# Patient Record
Sex: MALE | Race: BLACK OR AFRICAN AMERICAN | NOT HISPANIC OR LATINO | Employment: UNEMPLOYED | URBAN - METROPOLITAN AREA
[De-identification: names, ages, dates, MRNs, and addresses within clinical notes are randomized per-mention and may not be internally consistent; named-entity substitution may affect disease eponyms.]

---

## 2023-03-06 ENCOUNTER — HOSPITAL ENCOUNTER (OUTPATIENT)
Dept: CT IMAGING | Facility: HOSPITAL | Age: 78
Discharge: HOME | End: 2023-03-06
Payer: MEDICARE

## 2023-03-06 ENCOUNTER — OFFICE VISIT (OUTPATIENT)
Dept: PULMONOLOGY | Facility: CLINIC | Age: 78
End: 2023-03-06
Payer: MEDICARE

## 2023-03-06 ENCOUNTER — HOSPITAL ENCOUNTER (OUTPATIENT)
Dept: RESPIRATORY THERAPY | Facility: HOSPITAL | Age: 78
Discharge: HOME | End: 2023-03-06
Payer: MEDICARE

## 2023-03-06 ENCOUNTER — LAB (OUTPATIENT)
Dept: LAB | Facility: HOSPITAL | Age: 78
End: 2023-03-06
Payer: MEDICARE

## 2023-03-06 VITALS
OXYGEN SATURATION: 97 % | TEMPERATURE: 97.8 F | WEIGHT: 239 LBS | SYSTOLIC BLOOD PRESSURE: 147 MMHG | HEART RATE: 93 BPM | RESPIRATION RATE: 21 BRPM | HEIGHT: 70 IN | DIASTOLIC BLOOD PRESSURE: 100 MMHG | BODY MASS INDEX: 34.22 KG/M2

## 2023-03-06 DIAGNOSIS — R06.2 WHEEZING: ICD-10-CM

## 2023-03-06 DIAGNOSIS — R06.02 SOB (SHORTNESS OF BREATH): ICD-10-CM

## 2023-03-06 DIAGNOSIS — J45.51 SEVERE PERSISTENT ASTHMA WITH ACUTE EXACERBATION: ICD-10-CM

## 2023-03-06 DIAGNOSIS — R06.02 SOB (SHORTNESS OF BREATH): Primary | ICD-10-CM

## 2023-03-06 DIAGNOSIS — R05.3 CHRONIC COUGH: ICD-10-CM

## 2023-03-06 LAB
ALBUMIN SERPL-MCNC: 3.8 G/DL (ref 3.5–5.2)
ALBUMIN/GLOB SERPL: 1.1 G/DL
ALP SERPL-CCNC: 84 U/L (ref 39–117)
ALT SERPL W P-5'-P-CCNC: 14 U/L (ref 1–41)
ANION GAP SERPL CALCULATED.3IONS-SCNC: 8 MMOL/L (ref 5–15)
AST SERPL-CCNC: 16 U/L (ref 1–40)
BASOPHILS # BLD AUTO: 0.07 10*3/MM3 (ref 0–0.2)
BASOPHILS NFR BLD AUTO: 0.7 % (ref 0–1.5)
BILIRUB SERPL-MCNC: 0.2 MG/DL (ref 0–1.2)
BUN SERPL-MCNC: 14 MG/DL (ref 8–23)
BUN/CREAT SERPL: 11.3 (ref 7–25)
CALCIUM SPEC-SCNC: 9.7 MG/DL (ref 8.6–10.5)
CHLORIDE SERPL-SCNC: 105 MMOL/L (ref 98–107)
CO2 SERPL-SCNC: 28 MMOL/L (ref 22–29)
CREAT SERPL-MCNC: 1.24 MG/DL (ref 0.76–1.27)
DEPRECATED RDW RBC AUTO: 43.5 FL (ref 37–54)
EGFRCR SERPLBLD CKD-EPI 2021: 60.6 ML/MIN/1.73
EOSINOPHIL # BLD AUTO: 0.54 10*3/MM3 (ref 0–0.4)
EOSINOPHIL NFR BLD AUTO: 5.4 % (ref 0.3–6.2)
ERYTHROCYTE [DISTWIDTH] IN BLOOD BY AUTOMATED COUNT: 13.8 % (ref 12.3–15.4)
EXHALED NITROUS OXIDE: 0
GLOBULIN UR ELPH-MCNC: 3.6 GM/DL
GLUCOSE SERPL-MCNC: 91 MG/DL (ref 65–99)
HCT VFR BLD AUTO: 40.3 % (ref 37.5–51)
HGB BLD-MCNC: 13.3 G/DL (ref 13–17.7)
IMM GRANULOCYTES # BLD AUTO: 0.06 10*3/MM3 (ref 0–0.05)
IMM GRANULOCYTES NFR BLD AUTO: 0.6 % (ref 0–0.5)
LYMPHOCYTES # BLD AUTO: 2.18 10*3/MM3 (ref 0.7–3.1)
LYMPHOCYTES NFR BLD AUTO: 21.8 % (ref 19.6–45.3)
MCH RBC QN AUTO: 28.3 PG (ref 26.6–33)
MCHC RBC AUTO-ENTMCNC: 33 G/DL (ref 31.5–35.7)
MCV RBC AUTO: 85.7 FL (ref 79–97)
MONOCYTES # BLD AUTO: 1.15 10*3/MM3 (ref 0.1–0.9)
MONOCYTES NFR BLD AUTO: 11.5 % (ref 5–12)
NEUTROPHILS NFR BLD AUTO: 6 10*3/MM3 (ref 1.7–7)
NEUTROPHILS NFR BLD AUTO: 60 % (ref 42.7–76)
NRBC BLD AUTO-RTO: 0 /100 WBC (ref 0–0.2)
PLATELET # BLD AUTO: 381 10*3/MM3 (ref 140–450)
PMV BLD AUTO: 10.1 FL (ref 6–12)
POTASSIUM SERPL-SCNC: 4.2 MMOL/L (ref 3.5–5.2)
PROT SERPL-MCNC: 7.4 G/DL (ref 6–8.5)
RBC # BLD AUTO: 4.7 10*6/MM3 (ref 4.14–5.8)
SODIUM SERPL-SCNC: 141 MMOL/L (ref 136–145)
WBC NRBC COR # BLD: 10 10*3/MM3 (ref 3.4–10.8)

## 2023-03-06 PROCEDURE — 85025 COMPLETE CBC W/AUTO DIFF WBC: CPT

## 2023-03-06 PROCEDURE — 95012 NITRIC OXIDE EXP GAS DETER: CPT | Performed by: INTERNAL MEDICINE

## 2023-03-06 PROCEDURE — 94729 DIFFUSING CAPACITY: CPT

## 2023-03-06 PROCEDURE — 82785 ASSAY OF IGE: CPT

## 2023-03-06 PROCEDURE — 94726 PLETHYSMOGRAPHY LUNG VOLUMES: CPT

## 2023-03-06 PROCEDURE — 99204 OFFICE O/P NEW MOD 45 MIN: CPT | Performed by: INTERNAL MEDICINE

## 2023-03-06 PROCEDURE — 71250 CT THORAX DX C-: CPT

## 2023-03-06 PROCEDURE — 87070 CULTURE OTHR SPECIMN AEROBIC: CPT

## 2023-03-06 PROCEDURE — 87116 MYCOBACTERIA CULTURE: CPT

## 2023-03-06 PROCEDURE — 86003 ALLG SPEC IGE CRUDE XTRC EA: CPT

## 2023-03-06 PROCEDURE — 80053 COMPREHEN METABOLIC PANEL: CPT

## 2023-03-06 PROCEDURE — 87205 SMEAR GRAM STAIN: CPT

## 2023-03-06 PROCEDURE — 94060 EVALUATION OF WHEEZING: CPT

## 2023-03-06 PROCEDURE — 87206 SMEAR FLUORESCENT/ACID STAI: CPT

## 2023-03-06 PROCEDURE — 36415 COLL VENOUS BLD VENIPUNCTURE: CPT

## 2023-03-06 RX ORDER — REVEFENACIN 175 UG/3ML
175 SOLUTION RESPIRATORY (INHALATION)
Qty: 90 ML | Refills: 11
Start: 2023-03-06

## 2023-03-06 RX ORDER — FUROSEMIDE 20 MG/1
TABLET ORAL
COMMUNITY
Start: 2022-12-22

## 2023-03-06 RX ORDER — ARFORMOTEROL TARTRATE 15 UG/2ML
15 SOLUTION RESPIRATORY (INHALATION)
Qty: 120 ML | Refills: 11
Start: 2023-03-06

## 2023-03-06 RX ORDER — ALBUTEROL SULFATE 2.5 MG/3ML
2.5 SOLUTION RESPIRATORY (INHALATION) ONCE
Status: COMPLETED | OUTPATIENT
Start: 2023-03-06 | End: 2023-03-06

## 2023-03-06 RX ORDER — BUDESONIDE, GLYCOPYRROLATE, AND FORMOTEROL FUMARATE 160; 9; 4.8 UG/1; UG/1; UG/1
2 AEROSOL, METERED RESPIRATORY (INHALATION) 2 TIMES DAILY
COMMUNITY
End: 2023-03-13 | Stop reason: SDUPTHER

## 2023-03-06 RX ORDER — BUDESONIDE 0.5 MG/2ML
0.5 INHALANT ORAL
Qty: 120 ML | Refills: 11
Start: 2023-03-06

## 2023-03-06 RX ORDER — POTASSIUM CHLORIDE 750 MG/1
TABLET, EXTENDED RELEASE ORAL
COMMUNITY
Start: 2022-12-22

## 2023-03-06 RX ORDER — VALSARTAN AND HYDROCHLOROTHIAZIDE 160; 12.5 MG/1; MG/1
1 TABLET, FILM COATED ORAL DAILY
COMMUNITY

## 2023-03-06 RX ORDER — SIMVASTATIN 20 MG
20 TABLET ORAL
COMMUNITY

## 2023-03-06 RX ORDER — CHLORAL HYDRATE 500 MG
CAPSULE ORAL
COMMUNITY

## 2023-03-06 RX ADMIN — ALBUTEROL SULFATE 2.5 MG: 2.5 SOLUTION RESPIRATORY (INHALATION) at 16:28

## 2023-03-06 NOTE — PROGRESS NOTES
"Chief Complaint  Establish Care (New Patient ), Asthma, COPD, Shortness of Breath, Cough (Phlegm (green/ grayish)), and Wheezing    Subjective        Andrew Hart presents to [facility] PULMONARY & CRITICAL CARE MEDICINE  History of Present Illness  Patient visit for shortness of breath  Reported asthma since he was a child  Develop asthma again approximately couple years ago  His severe shortness of breath  Has been on continuous steroids now for more than 4 months  Has daily wheezing  Uses Breztri but has persistent symptoms  Nightly nocturnal symptoms  6-8 times per day having to use rescue inhaler  Has shortness of breath with all activities  Has daily wheezing  Objective   Vital Signs:  /100 (BP Location: Left arm, Patient Position: Sitting, Cuff Size: Large Adult)   Pulse 93   Temp 97.8 °F (36.6 °C) (Temporal)   Resp 21   Ht 176.8 cm (69.6\")   Wt 108 kg (239 lb)   SpO2 97% Comment: room air  BMI 34.69 kg/m²   Estimated body mass index is 34.69 kg/m² as calculated from the following:    Height as of this encounter: 176.8 cm (69.6\").    Weight as of this encounter: 108 kg (239 lb).             Physical Exam   General ill-appearing male does have conversational dyspnea does have pursed lip breathing  Lungs-conversational dyspnea pursed lip breathing present diffuse expiratory wheezes heard throughout all lung fields  No evidence of clubbing  Neuro alert and oriented x3  Abdomen soft nontender nondistended  Extremities-no clubbing no edema  Result Review :                   Assessment and Plan   Diagnoses and all orders for this visit:    1. SOB (shortness of breath) (Primary)  Assessment & Plan:  Bronchodilator therapies as per under asthma    We will obtain overnight oximetry and start patient on oxygen if meets requirement    Obtain CBC to rule out anemia    Obtain CMP to rule out acidosis    Obtain full pulmonary function studies    Orders:  -     Full Pulmonary Function Test " With Bronchodilator; Future  -     CBC & Differential; Future  -     IgE Level; Future  -     Aspergillus Fumigatus IgE; Future  -     Comprehensive Metabolic Panel; Future  -     arformoterol (Brovana) 15 MCG/2ML nebulizer solution; Take 2 mL by nebulization 2 (Two) Times a Day.  Dispense: 120 mL; Refill: 11  -     budesonide (Pulmicort) 0.5 MG/2ML nebulizer solution; Take 2 mL by nebulization Daily.  Dispense: 120 mL; Refill: 11  -     revefenacin (Yupelri) 175 MCG/3ML nebulizer solution; Take 3 mL by nebulization Daily.  Dispense: 90 mL; Refill: 11  -     POCT FENO Test  -     Overnight Sleep Oximetry Study; Future    2. Chronic cough  Assessment & Plan:  We will obtain sputum culture for mycobacterial and AFB    Obtain PFT    High-resolution CT scan of the chest ordered    Obtain IgE level and if elevated Aspergillus specific IgE    Orders:  -     CT Chest Hi Resolution Diagnostic; Future  -     Respiratory Culture - Sputum, Cough; Future  -     AFB Culture - Sputum, Cough; Future    3. Wheezing  -     Aspergillus Fumigatus IgE; Future    4. Severe persistent asthma with acute exacerbation  Assessment & Plan:   has recently came off steroids  We will discontinue Breztri at this time  We will start patient on Brovana Pulmicort and Yupelri  As needed DuoNebs  We will obtain PFTs today  We will see patient back in 1 week hopefully at that time we can start patient on a biological agent    Obtain CBC to assess for eosinophilia  Obtain IgE level    Obtain high-resolution CT scan of the chest to assess for bronchiectasis               Follow Up   No follow-ups on file.  Patient was given instructions and counseling regarding his condition or for health maintenance advice. Please see specific information pulled into the AVS if appropriate.

## 2023-03-06 NOTE — ASSESSMENT & PLAN NOTE
Bronchodilator therapies as per under asthma    We will obtain overnight oximetry and start patient on oxygen if meets requirement    Obtain CBC to rule out anemia    Obtain CMP to rule out acidosis    Obtain full pulmonary function studies

## 2023-03-06 NOTE — ASSESSMENT & PLAN NOTE
We will obtain sputum culture for mycobacterial and AFB    Obtain PFT    High-resolution CT scan of the chest ordered    Obtain IgE level and if elevated Aspergillus specific IgE   no

## 2023-03-06 NOTE — ASSESSMENT & PLAN NOTE
has recently came off steroids  We will discontinue Breztri at this time  We will start patient on Brovana Pulmicort and Yupelri  As needed DuoNebs  We will obtain PFTs today  We will see patient back in 1 week hopefully at that time we can start patient on a biological agent    Obtain CBC to assess for eosinophilia  Obtain IgE level    Obtain high-resolution CT scan of the chest to assess for bronchiectasis

## 2023-03-07 LAB
A FUMIGATUS IGE QN: 0.5 KU/L
IGE SERPL-ACNC: 98.5 KU/L

## 2023-03-08 LAB
BACTERIA SPEC RESP CULT: NORMAL
GRAM STN SPEC: NORMAL

## 2023-03-09 DIAGNOSIS — J45.50 SEVERE PERSISTENT ASTHMA, UNCOMPLICATED: Primary | ICD-10-CM

## 2023-03-09 DIAGNOSIS — J82.83 EOSINOPHILIC ASTHMA: ICD-10-CM

## 2023-03-09 RX ORDER — MEPOLIZUMAB 100 MG/ML
100 INJECTION, SOLUTION SUBCUTANEOUS
Qty: 1 ML | Refills: 11 | Status: SHIPPED | OUTPATIENT
Start: 2023-03-09

## 2023-03-13 ENCOUNTER — OFFICE VISIT (OUTPATIENT)
Dept: PULMONOLOGY | Facility: CLINIC | Age: 78
End: 2023-03-13
Payer: OTHER GOVERNMENT

## 2023-03-13 VITALS
HEIGHT: 70 IN | BODY MASS INDEX: 33.9 KG/M2 | SYSTOLIC BLOOD PRESSURE: 148 MMHG | HEART RATE: 88 BPM | WEIGHT: 236.8 LBS | OXYGEN SATURATION: 97 % | TEMPERATURE: 98.6 F | DIASTOLIC BLOOD PRESSURE: 81 MMHG | RESPIRATION RATE: 18 BRPM

## 2023-03-13 DIAGNOSIS — R06.02 SOB (SHORTNESS OF BREATH): ICD-10-CM

## 2023-03-13 DIAGNOSIS — J45.51 SEVERE PERSISTENT ASTHMA WITH ACUTE EXACERBATION: Primary | ICD-10-CM

## 2023-03-13 DIAGNOSIS — D72.19 PERIPHERAL EOSINOPHILIA: ICD-10-CM

## 2023-03-13 DIAGNOSIS — R05.3 CHRONIC COUGH: ICD-10-CM

## 2023-03-13 PROCEDURE — 99214 OFFICE O/P EST MOD 30 MIN: CPT

## 2023-03-13 RX ORDER — EPINEPHRINE 0.3 MG/.3ML
0.3 INJECTION SUBCUTANEOUS ONCE
Qty: 1 EACH | Refills: 0 | Status: SHIPPED | OUTPATIENT
Start: 2023-03-13 | End: 2023-03-13

## 2023-03-13 RX ORDER — BUDESONIDE, GLYCOPYRROLATE, AND FORMOTEROL FUMARATE 160; 9; 4.8 UG/1; UG/1; UG/1
2 AEROSOL, METERED RESPIRATORY (INHALATION) 2 TIMES DAILY
Qty: 1 EACH | Refills: 5 | Status: SHIPPED | OUTPATIENT
Start: 2023-03-13

## 2023-03-13 RX ORDER — IPRATROPIUM BROMIDE AND ALBUTEROL SULFATE 2.5; .5 MG/3ML; MG/3ML
3 SOLUTION RESPIRATORY (INHALATION) EVERY 4 HOURS PRN
COMMUNITY

## 2023-03-13 NOTE — PROGRESS NOTES
Primary Care Provider  Torres Acevedo MD   Referring Provider  No ref. provider found      Patient Complaint  Asthma, Wheezing, Cough, Shortness of Breath, Results (Labs, and CT), Follow-up (1 Week follow-up), and Chest Pain      Subjective          Andrew Hart presents to [facility] PULMONARY & CRITICAL CARE MEDICINE      History of Presenting Illness  Andrew Hart is a 77 y.o. male with history of severe persistent asthma, peripheral eosinophilia, shortness of breath, and chronic cough, here for follow-up.    Patient was last seen in our clinic 3/6/2023 with Dr. Silvestre for severe asthma exacerbation despite just having finished a course of steroids.  His shortness of breath was severe at that time, and he continues to have fairly severe symptoms today with pursed lip breathing.  He continues to wheeze constantly and feels chest tightness, as well as persistent cough.  His symptoms are worse at night.  He was diagnosed with asthma as a child and reports that his symptoms have worsened again in the past couple years.  He currently uses Breztri but is in the process of transitioning to nebulized Brovana, Pulmicort, and Yupelri which were prescribed last visit, awaiting insurance approval.  He reports that he has been using his rescue inhaler 6-8 times per day during this exacerbation.  He reports shortness of breath with all activity.  Last visit, patient was sent directly from our clinic to have imaging and blood work done.  Sputum culture ordered to check for Mycobacterium and AFB.  HRCT done 3/6/2023 showed diffuse peribronchial thickening could be related to bronchitis, minor areas of linear scarring, and evidence of prior granulomatous infection.  PFT done 3/6/2023 showed severe obstructive airway disease FEV1 32% predicted, very significant response to bronchodilator, air trapping, and reduced DLCO.  Labs showed elevated eosinophils 540, elevated IgE.  These results as well as his  symptoms qualify patient for biologic therapy.  Case discussed with nurse navigator and now in process of getting insurance approval for Nucala.  We will give first Nucala injection in clinic today as patient is still experiencing severe acute symptoms.  Patient has never smoked. Patient is up-to-date with flu, COVID, vaccines and declines pneumonia vaccine at this time.  Patient is not able to perform ADLs without difficulty at this time.  I have personally reviewed the review of systems, past family, social, medical and surgical histories; and agree with their findings.      Review of Systems    Review of Systems   Constitutional: Negative for activity change, chills, fatigue, fever, unexpected weight gain and unexpected weight loss.   HENT: Negative for congestion, ear discharge, ear pain, mouth sores, postnasal drip, rhinorrhea, sinus pressure, sore throat, swollen glands and trouble swallowing.    Eyes: Negative for blurred vision, pain, discharge, itching and visual disturbance.   Respiratory: Positive for cough, chest tightness, shortness of breath and wheezing (constantly). Negative for apnea and stridor.    Cardiovascular: Negative for chest pain, palpitations and leg swelling.   Gastrointestinal: Negative for abdominal distention, abdominal pain, constipation, diarrhea, nausea, vomiting, GERD and indigestion.   Musculoskeletal: Negative for arthralgias, joint swelling and myalgias.   Skin: Negative for color change.   Neurological: Negative for dizziness, weakness, light-headedness and headache.      Sleep: Negative for Excessive daytime sleepiness  Negative for morning headaches  Negative for Snoring      Family History   Family history unknown: Yes        Social History     Socioeconomic History   • Marital status:    Tobacco Use   • Smoking status: Never   • Smokeless tobacco: Never   Vaping Use   • Vaping Use: Never used   Substance and Sexual Activity   • Alcohol use: Never   • Drug use: Never    • Sexual activity: Defer        Past Medical History:   Diagnosis Date   • Asthma, intrinsic         Immunization History   Administered Date(s) Administered   • COVID-19 (MODERNA) 1st, 2nd, 3rd Dose Only 02/22/2021, 03/29/2021, 10/25/2021   • COVID-19 (MODERNA) BIVALENT BOOSTER 12+YRS 11/14/2022   • Fluzone High Dose =>65 Years ([employer] ONLY) 10/26/2016, 12/31/2022   • Pneumococcal Conjugate 13-Valent (PCV13) 10/26/2016       No Known Allergies       Current Outpatient Medications:   •  ALBUTEROL IN, Inhale., Disp: , Rfl:   •  Budeson-Glycopyrrol-Formoterol (Breztri Aerosphere) 160-9-4.8 MCG/ACT aerosol inhaler, Inhale 2 puffs 2 (Two) Times a Day., Disp: 1 each, Rfl: 5  •  furosemide (LASIX) 20 MG tablet, , Disp: , Rfl:   •  ipratropium-albuterol (DUO-NEB) 0.5-2.5 mg/3 ml nebulizer, Take 3 mL by nebulization Every 4 (Four) Hours As Needed for Wheezing., Disp: , Rfl:   •  Mepolizumab (Nucala) 100 MG/ML solution prefilled syringe, Inject 1 mL under the skin into the appropriate area as directed Every 28 (Twenty-Eight) Days., Disp: 1 mL, Rfl: 11  •  metFORMIN (GLUCOPHAGE) 1000 MG tablet, Take 1 tablet by mouth., Disp: , Rfl:   •  Omega-3 Fatty Acids (fish oil) 1000 MG capsule capsule, Take  by mouth Daily With Breakfast., Disp: , Rfl:   •  potassium chloride (K-DUR,KLOR-CON) 10 MEQ CR tablet, , Disp: , Rfl:   •  simvastatin (ZOCOR) 20 MG tablet, Take 1 tablet by mouth., Disp: , Rfl:   •  valsartan-hydrochlorothiazide (DIOVAN-HCT) 160-12.5 MG per tablet, Take 1 tablet by mouth Daily., Disp: , Rfl:   •  arformoterol (Brovana) 15 MCG/2ML nebulizer solution, Take 2 mL by nebulization 2 (Two) Times a Day., Disp: 120 mL, Rfl: 11  •  budesonide (Pulmicort) 0.5 MG/2ML nebulizer solution, Take 2 mL by nebulization Daily., Disp: 120 mL, Rfl: 11  •  EPINEPHrine (EpiPen 2-Octavio) 0.3 MG/0.3ML solution auto-injector injection, Inject 0.3 mL into the appropriate muscle as directed by prescriber 1 (One) Time for 1 dose., Disp: 1  "each, Rfl: 0  •  revefenacin (Yupelri) 175 MCG/3ML nebulizer solution, Take 3 mL by nebulization Daily., Disp: 90 mL, Rfl: 11     Objective     Vital Signs:   /81 (BP Location: Left arm, Patient Position: Sitting, Cuff Size: Large Adult)   Pulse 88   Temp 98.6 °F (37 °C) (Tympanic)   Resp 18   Ht 176.8 cm (69.61\")   Wt 107 kg (236 lb 12.8 oz)   SpO2 97% Comment: room air  BMI 34.36 kg/m²     Objective   Physical Exam  Constitutional:       General: He is not in acute distress.     Appearance: Normal appearance. He is ill-appearing.   HENT:      Right Ear: Tympanic membrane and ear canal normal.      Left Ear: Tympanic membrane and ear canal normal.      Nose: Nose normal.      Mouth/Throat:      Mouth: Mucous membranes are moist.      Pharynx: Oropharynx is clear.   Eyes:      Extraocular Movements: Extraocular movements intact.      Conjunctiva/sclera: Conjunctivae normal.      Pupils: Pupils are equal, round, and reactive to light.   Cardiovascular:      Rate and Rhythm: Normal rate and regular rhythm.      Pulses: Normal pulses.      Heart sounds: Normal heart sounds.   Pulmonary:      Effort: Respiratory distress (mild, pursed lip breathing) present.      Breath sounds: No stridor. Wheezing present. No rhonchi or rales.   Abdominal:      General: Bowel sounds are normal.      Palpations: Abdomen is soft.   Musculoskeletal:         General: No swelling. Normal range of motion.      Cervical back: Normal range of motion and neck supple.      Right lower leg: No edema.      Left lower leg: No edema.   Skin:     General: Skin is warm and dry.   Neurological:      General: No focal deficit present.      Mental Status: He is alert and oriented to person, place, and time.      Motor: No weakness.   Psychiatric:         Mood and Affect: Mood normal.         Behavior: Behavior normal.          Result Review :   I have personally reviewed patient's labs and images, including HRCT 3/6/2023, and CBC, CMP, IgE " from 3/6/2023.            Diagnoses and all orders for this visit:    1. Severe persistent asthma with acute exacerbation (Primary)  -     Budeson-Glycopyrrol-Formoterol (Breztri Aerosphere) 160-9-4.8 MCG/ACT aerosol inhaler; Inhale 2 puffs 2 (Two) Times a Day.  Dispense: 1 each; Refill: 5    2. Peripheral eosinophilia    3. SOB (shortness of breath)  -     Budeson-Glycopyrrol-Formoterol (Breztri Aerosphere) 160-9-4.8 MCG/ACT aerosol inhaler; Inhale 2 puffs 2 (Two) Times a Day.  Dispense: 1 each; Refill: 5    4. Chronic cough       Impression and Plan    -HRCT, PFT, and blood work results discussed in depth with patient at today's visit, confirmed diagnosis of severe persistent asthma.  -Instructed patient to continue using Breztri inhaler 2 puffs twice daily until he gets his nebulizer treatments, will send refills today.  -Start Brovana and Pulmicort neb treatments twice daily when available  -Start using Yupelri neb treatment once daily, reminded patient not to mix with other nebulized medications.  -Continue using DuoNebs up to 4 times a day, recommended that patient due to maximum frequency during this exacerbation.   -Continue using albuterol rescue inhaler as needed, recommend using rescue inhaler between DuoNeb treatments.  -Patient met with [facility] nurse navigator to discuss initiation of Nucala, received first injection today in clinic.  EpiPen prescribed today as well.  -Follow-up in 1 month with Dr. Silvestre or myself, may return sooner if needed.  If patient's symptoms worsen instructed him to call our clinic or report to ED.    Smoking status: Reviewed, patient is a never smoker  Vaccination status: Patient reports he is up-to-date with his flu and Covid vaccines, declines pneumonia vaccine today.  Patient is advised to continue to follow CDC recommendations such as social distancing wearing a mask and washing hands for at least 20 seconds.  Medications personally reviewed    Follow Up   Return in about 1  month (around 4/13/2023).  Patient was given instructions and counseling regarding his condition or for health maintenance advice. Please see specific information pulled into the AVS if appropriate.

## 2023-03-22 PROCEDURE — 94060 EVALUATION OF WHEEZING: CPT | Performed by: INTERNAL MEDICINE

## 2023-03-22 PROCEDURE — 94726 PLETHYSMOGRAPHY LUNG VOLUMES: CPT | Performed by: INTERNAL MEDICINE

## 2023-03-22 PROCEDURE — 94729 DIFFUSING CAPACITY: CPT | Performed by: INTERNAL MEDICINE

## 2023-04-07 NOTE — PROGRESS NOTES
Primary Care Provider  Torres Acevedo MD   Referring Provider  No ref. provider found      Patient Complaint  Asthma, Shortness of Breath, and Follow-up      Subjective          Andrew Hart presents to [facility]      History of Presenting Illness  Andrew Hart is a 77 y.o. male with history of severe persistent asthma, peripheral eosinophilia, shortness of breath, and chronic cough, here for 1 month follow-up of acute asthma exacerbation.    Patient was last seen in our clinic 3/13/2023 by myself for severe asthma exacerbation despite just having finished a course of steroids.  His shortness of breath was severe at that time with pursed lip breathing.  He complained of constant wheezing, chest tightness, and persistent cough.  His symptoms were worse at night.  He was diagnosed with asthma as a child and states that his symptoms have worsened in the past couple years.  He currently uses Brovana, Pulmicort, and Yupelri neb treatments daily as prescribed.  He also has an albuterol rescue inhaler that he uses several times a day.  Based on labs and PFTs, as well as his symptoms, patient qualified for biologic therapy.  Case discussed with nurse navigator and first injection of Nucala given in clinic 3/13/2023.  Since then, patient has been doing much better, wheezing and chest tightness have fully resolved.  He does still have some shortness of breath especially with exertion, but it is not nearly as bad.  He continues to wear 2 L of oxygen at night and during the day as needed, he is inquiring about getting a portable oxygen concentrator if possible.  Patient has never smoked.  I have personally reviewed the review of systems, past family, social, medical and surgical histories; and agree with their findings.      Review of Systems    Review of Systems   Constitutional: Negative for activity change, chills, fatigue, fever, unexpected weight gain and  unexpected weight loss.   HENT: Negative for congestion, ear discharge, ear pain, mouth sores, postnasal drip, rhinorrhea, sinus pressure, sore throat, swollen glands and trouble swallowing.    Eyes: Negative for blurred vision, pain, discharge, itching and visual disturbance.   Respiratory: Positive for shortness of breath. Negative for apnea, cough, chest tightness, wheezing and stridor.    Cardiovascular: Negative for chest pain, palpitations and leg swelling.   Gastrointestinal: Negative for abdominal distention, abdominal pain, constipation, diarrhea, nausea, vomiting, GERD and indigestion.   Musculoskeletal: Negative for arthralgias, joint swelling and myalgias.   Skin: Negative for color change.   Neurological: Negative for dizziness, weakness, light-headedness and headache.      Sleep: Negative for Excessive daytime sleepiness  Negative for morning headaches  Negative for Snoring      Family History   Family history unknown: Yes        Social History     Socioeconomic History   • Marital status:    Tobacco Use   • Smoking status: Never   • Smokeless tobacco: Never   Vaping Use   • Vaping Use: Never used   Substance and Sexual Activity   • Alcohol use: Never   • Drug use: Never   • Sexual activity: Defer        Past Medical History:   Diagnosis Date   • Asthma, intrinsic         Immunization History   Administered Date(s) Administered   • COVID-19 (MODERNA) 1st, 2nd, 3rd Dose Only 02/22/2021, 03/29/2021, 10/25/2021   • COVID-19 (MODERNA) BIVALENT BOOSTER 12+YRS 11/14/2022   • Fluzone High Dose =>65 Years ([employer] ONLY) 10/26/2016, 12/31/2022   • Pneumococcal Conjugate 13-Valent (PCV13) 10/26/2016       No Known Allergies       Current Outpatient Medications:   •  ALBUTEROL IN, Inhale., Disp: , Rfl:   •  arformoterol (Brovana) 15 MCG/2ML nebulizer solution, Take 2 mL by nebulization 2 (Two) Times a Day., Disp: 120 mL, Rfl: 11  •  budesonide (Pulmicort) 0.5 MG/2ML nebulizer solution, Take 2 mL by  "nebulization Daily., Disp: 120 mL, Rfl: 11  •  EPINEPHrine (EPIPEN) 0.3 MG/0.3ML solution auto-injector injection, , Disp: , Rfl:   •  furosemide (LASIX) 20 MG tablet, , Disp: , Rfl:   •  ipratropium-albuterol (DUO-NEB) 0.5-2.5 mg/3 ml nebulizer, Take 3 mL by nebulization Every 4 (Four) Hours As Needed for Wheezing., Disp: , Rfl:   •  Mepolizumab (Nucala) 100 MG/ML solution prefilled syringe, Inject 1 mL under the skin into the appropriate area as directed Every 28 (Twenty-Eight) Days., Disp: 1 mL, Rfl: 11  •  metFORMIN (GLUCOPHAGE) 1000 MG tablet, Take 1 tablet by mouth., Disp: , Rfl:   •  Omega-3 Fatty Acids (fish oil) 1000 MG capsule capsule, Take  by mouth Daily With Breakfast., Disp: , Rfl:   •  potassium chloride (K-DUR,KLOR-CON) 10 MEQ CR tablet, , Disp: , Rfl:   •  revefenacin (Yupelri) 175 MCG/3ML nebulizer solution, Take 3 mL by nebulization Daily., Disp: 90 mL, Rfl: 11  •  simvastatin (ZOCOR) 20 MG tablet, Take 1 tablet by mouth., Disp: , Rfl:   •  valsartan-hydrochlorothiazide (DIOVAN-HCT) 160-12.5 MG per tablet, Take 1 tablet by mouth Daily., Disp: , Rfl:   •  Budeson-Glycopyrrol-Formoterol (Breztri Aerosphere) 160-9-4.8 MCG/ACT aerosol inhaler, Inhale 2 puffs 2 (Two) Times a Day. (Patient not taking: Reported on 4/11/2023), Disp: 1 each, Rfl: 5     Objective     Vital Signs:   /88 (BP Location: Right arm, Patient Position: Sitting, Cuff Size: Large Adult)   Pulse 83   Temp 98 °F (36.7 °C) (Tympanic)   Resp 18   Ht 176.8 cm (69.61\")   Wt 109 kg (241 lb 3.2 oz)   SpO2 97% Comment: room air/2L NOCTURNAL, PRN  BMI 35.00 kg/m²     Objective   Physical Exam  Constitutional:       General: He is not in acute distress.     Appearance: Normal appearance. He is not ill-appearing.   HENT:      Right Ear: Tympanic membrane and ear canal normal.      Left Ear: Tympanic membrane and ear canal normal.      Nose: Nose normal.      Mouth/Throat:      Mouth: Mucous membranes are moist.      Pharynx: " Oropharynx is clear.   Eyes:      Extraocular Movements: Extraocular movements intact.      Conjunctiva/sclera: Conjunctivae normal.      Pupils: Pupils are equal, round, and reactive to light.   Cardiovascular:      Rate and Rhythm: Normal rate and regular rhythm.      Pulses: Normal pulses.      Heart sounds: Normal heart sounds.   Pulmonary:      Effort: No respiratory distress.      Breath sounds: No stridor. No wheezing, rhonchi or rales.   Abdominal:      General: Bowel sounds are normal.      Palpations: Abdomen is soft.   Musculoskeletal:         General: No swelling. Normal range of motion.      Cervical back: Normal range of motion and neck supple.      Right lower leg: No edema.      Left lower leg: No edema.   Skin:     General: Skin is warm and dry.   Neurological:      General: No focal deficit present.      Mental Status: He is alert and oriented to person, place, and time.      Motor: No weakness.   Psychiatric:         Mood and Affect: Mood normal.         Behavior: Behavior normal.       Result Review :   I have personally reviewed patient's labs and images, including HRCT 3/6/2023, and CBC, CMP, IgE from 3/6/2023.  I also reviewed my last office note 3/13/2023.       Diagnoses and all orders for this visit:    1. Severe persistent asthma with acute exacerbation (Primary)    2. Peripheral eosinophilia    3. Chronic cough    4. SOB (shortness of breath)  -     6 Minute Walk Test; Future    5. Obesity (BMI 30-39.9)       Impression and Plan    -HRCT 3/6/2023 showed diffuse peribronchial thickening could be related to bronchitis, minor areas of linear scarring, and evidence of prior granulomatous infection.  -PFT 3/6/2023 showed severe obstructive airway disease FEV1 32% predicted, very significant response to bronchodilator with 38% improvement in FEV1, air trapping, and reduced DLCO.  -Labs 3/6/2023 showed elevated eosinophils 540, elevated IgE 98.5  -Instructed patient to continue using Breztri  inhaler 2 puffs twice daily until he gets his nebulizer treatments, will send refills today.  -Continue Brovana and Pulmicort neb treatments twice daily when available, reminded patient to rinse mouth after use  -Continue using Yupelri neb treatment once daily  -Continue using DuoNebs and albuterol rescue inhaler as needed  -Continue Nucala injections monthly as prescribed, autoinjector given to patient in clinic today to self-administer at home.  Patient educated that he will need to get these from his pharmacy or hospital going forward.  -Continue wearing oxygen 2 L at night and as needed during the day, will order 6-minute walk test in clinic today to evaluate for portable oxygen concentrator  -Spent 5 minutes counseling patient on diet and exercise.  Recommended a low-fat, low-calorie diet.  Also recommend 30 minutes of daily exercise.  Patient verbalizes understanding.  -Follow-up in 2-3 months with Dr. Silvestre or myself, may return sooner if needed.    Smoking status: Reviewed, patient is a never smoker  Vaccination status: Patient reports he is up-to-date with his flu and Covid vaccines, declines pneumonia vaccine today.  Patient is advised to continue to follow CDC recommendations such as social distancing wearing a mask and washing hands for at least 20 seconds.  Medications personally reviewed     Follow Up   Return in about 3 months (around 7/11/2023).  Patient was given instructions and counseling regarding his condition or for health maintenance advice. Please see specific information pulled into the AVS if appropriate.

## 2023-04-11 ENCOUNTER — OFFICE VISIT (OUTPATIENT)
Dept: PULMONOLOGY | Facility: CLINIC | Age: 78
End: 2023-04-11
Payer: MEDICARE

## 2023-04-11 ENCOUNTER — TELEPHONE (OUTPATIENT)
Dept: PULMONOLOGY | Facility: CLINIC | Age: 78
End: 2023-04-11

## 2023-04-11 VITALS
HEIGHT: 70 IN | SYSTOLIC BLOOD PRESSURE: 130 MMHG | OXYGEN SATURATION: 97 % | DIASTOLIC BLOOD PRESSURE: 88 MMHG | BODY MASS INDEX: 34.53 KG/M2 | HEART RATE: 83 BPM | WEIGHT: 241.2 LBS | RESPIRATION RATE: 18 BRPM | TEMPERATURE: 98 F

## 2023-04-11 DIAGNOSIS — J45.50 SEVERE PERSISTENT ASTHMA, UNCOMPLICATED: ICD-10-CM

## 2023-04-11 DIAGNOSIS — R05.3 CHRONIC COUGH: ICD-10-CM

## 2023-04-11 DIAGNOSIS — E66.9 OBESITY (BMI 30-39.9): ICD-10-CM

## 2023-04-11 DIAGNOSIS — J45.51 SEVERE PERSISTENT ASTHMA WITH ACUTE EXACERBATION: Primary | ICD-10-CM

## 2023-04-11 DIAGNOSIS — D72.19 PERIPHERAL EOSINOPHILIA: ICD-10-CM

## 2023-04-11 DIAGNOSIS — J82.83 EOSINOPHILIC ASTHMA: ICD-10-CM

## 2023-04-11 DIAGNOSIS — R06.02 SOB (SHORTNESS OF BREATH): ICD-10-CM

## 2023-04-11 PROCEDURE — 94618 PULMONARY STRESS TESTING: CPT

## 2023-04-11 PROCEDURE — 99214 OFFICE O/P EST MOD 30 MIN: CPT

## 2023-04-11 PROCEDURE — 1160F RVW MEDS BY RX/DR IN RCRD: CPT

## 2023-04-11 PROCEDURE — 1159F MED LIST DOCD IN RCRD: CPT

## 2023-04-11 RX ORDER — EPINEPHRINE 0.3 MG/.3ML
INJECTION SUBCUTANEOUS
COMMUNITY
Start: 2023-03-13

## 2023-04-11 RX ORDER — MEPOLIZUMAB 100 MG/ML
100 INJECTION, SOLUTION SUBCUTANEOUS
Qty: 1 ML | Refills: 11 | Status: SHIPPED | OUTPATIENT
Start: 2023-04-11

## 2023-04-11 NOTE — TELEPHONE ENCOUNTER
Nucala prescription was approved but copayment is greater than $1800.00 per month.  Will [facility] for [facility] assistance solutions.  Nucala sample given to patient  while copayment issue is resolved.

## 2023-04-17 LAB
MYCOBACTERIUM SPEC CULT: NORMAL
NIGHT BLUE STAIN TISS: NORMAL
NIGHT BLUE STAIN TISS: NORMAL

## 2023-05-12 ENCOUNTER — TELEPHONE (OUTPATIENT)
Dept: PULMONOLOGY | Facility: CLINIC | Age: 78
End: 2023-05-12
Payer: OTHER GOVERNMENT

## 2023-05-12 NOTE — TELEPHONE ENCOUNTER
Patient called requesting clinical documents supporting the need for biological medication to manage severe asthma symptoms be faxed to the [phone]. We will continue to supply patient with monthly samples until insurance approval is received. All requested documents were faxed.

## 2023-08-03 ENCOUNTER — HOSPITAL ENCOUNTER (INPATIENT)
Facility: HOSPITAL | Age: 78
LOS: 3 days | Discharge: OTHER INSTITUTION | DRG: 215 | End: 2023-08-06
Attending: EMERGENCY MEDICINE | Admitting: HOSPITALIST
Payer: OTHER GOVERNMENT

## 2023-08-03 ENCOUNTER — OFFICE VISIT (OUTPATIENT)
Dept: PULMONOLOGY | Facility: CLINIC | Age: 78
End: 2023-08-03
Payer: OTHER GOVERNMENT

## 2023-08-03 ENCOUNTER — APPOINTMENT (OUTPATIENT)
Dept: GENERAL RADIOLOGY | Facility: HOSPITAL | Age: 78
DRG: 215 | End: 2023-08-03
Payer: OTHER GOVERNMENT

## 2023-08-03 VITALS
WEIGHT: 255.3 LBS | HEIGHT: 68 IN | OXYGEN SATURATION: 99 % | SYSTOLIC BLOOD PRESSURE: 128 MMHG | RESPIRATION RATE: 18 BRPM | BODY MASS INDEX: 38.69 KG/M2 | DIASTOLIC BLOOD PRESSURE: 81 MMHG | HEART RATE: 127 BPM

## 2023-08-03 DIAGNOSIS — J82.83 EOSINOPHILIC ASTHMA: ICD-10-CM

## 2023-08-03 DIAGNOSIS — R00.0 TACHYCARDIA, UNSPECIFIED: ICD-10-CM

## 2023-08-03 DIAGNOSIS — R63.5 WEIGHT GAIN: ICD-10-CM

## 2023-08-03 DIAGNOSIS — I50.9 ACUTE CONGESTIVE HEART FAILURE, UNSPECIFIED HEART FAILURE TYPE: Primary | ICD-10-CM

## 2023-08-03 DIAGNOSIS — R05.3 CHRONIC COUGH: ICD-10-CM

## 2023-08-03 DIAGNOSIS — D72.19 PERIPHERAL EOSINOPHILIA: ICD-10-CM

## 2023-08-03 DIAGNOSIS — I27.20 PULMONARY HTN: ICD-10-CM

## 2023-08-03 DIAGNOSIS — J44.9 CHRONIC OBSTRUCTIVE PULMONARY DISEASE, UNSPECIFIED COPD TYPE: ICD-10-CM

## 2023-08-03 DIAGNOSIS — I48.92 ATRIAL FLUTTER, UNSPECIFIED TYPE: ICD-10-CM

## 2023-08-03 DIAGNOSIS — Z78.9 DECREASED ACTIVITIES OF DAILY LIVING (ADL): ICD-10-CM

## 2023-08-03 DIAGNOSIS — R76.8 ELEVATED IGE LEVEL: ICD-10-CM

## 2023-08-03 DIAGNOSIS — M79.89 LEG SWELLING: ICD-10-CM

## 2023-08-03 DIAGNOSIS — E87.70 HYPERVOLEMIA, UNSPECIFIED HYPERVOLEMIA TYPE: ICD-10-CM

## 2023-08-03 DIAGNOSIS — E66.9 OBESITY (BMI 30-39.9): ICD-10-CM

## 2023-08-03 DIAGNOSIS — I50.21 ACUTE HFREF (HEART FAILURE WITH REDUCED EJECTION FRACTION): ICD-10-CM

## 2023-08-03 DIAGNOSIS — R06.02 SOB (SHORTNESS OF BREATH): Primary | ICD-10-CM

## 2023-08-03 DIAGNOSIS — R26.2 DIFFICULTY WALKING: ICD-10-CM

## 2023-08-03 DIAGNOSIS — J45.50 SEVERE PERSISTENT ASTHMA, UNCOMPLICATED: ICD-10-CM

## 2023-08-03 LAB
ALBUMIN SERPL-MCNC: 4.3 G/DL (ref 3.5–5.2)
ALBUMIN/GLOB SERPL: 1.3 G/DL
ALP SERPL-CCNC: 123 U/L (ref 39–117)
ALT SERPL W P-5'-P-CCNC: 158 U/L (ref 1–41)
ANION GAP SERPL CALCULATED.3IONS-SCNC: 12.5 MMOL/L (ref 5–15)
AST SERPL-CCNC: 78 U/L (ref 1–40)
BASOPHILS # BLD AUTO: 0.05 10*3/MM3 (ref 0–0.2)
BASOPHILS NFR BLD AUTO: 0.6 % (ref 0–1.5)
BILIRUB SERPL-MCNC: 0.7 MG/DL (ref 0–1.2)
BUN SERPL-MCNC: 28 MG/DL (ref 8–23)
BUN/CREAT SERPL: 17 (ref 7–25)
CALCIUM SPEC-SCNC: 9.7 MG/DL (ref 8.6–10.5)
CHLORIDE SERPL-SCNC: 102 MMOL/L (ref 98–107)
CO2 SERPL-SCNC: 25.5 MMOL/L (ref 22–29)
CREAT SERPL-MCNC: 1.65 MG/DL (ref 0.76–1.27)
DEPRECATED RDW RBC AUTO: 53.3 FL (ref 37–54)
EGFRCR SERPLBLD CKD-EPI 2021: 43 ML/MIN/1.73
EOSINOPHIL # BLD AUTO: 0.01 10*3/MM3 (ref 0–0.4)
EOSINOPHIL NFR BLD AUTO: 0.1 % (ref 0.3–6.2)
ERYTHROCYTE [DISTWIDTH] IN BLOOD BY AUTOMATED COUNT: 17.3 % (ref 12.3–15.4)
GEN 5 2HR TROPONIN T REFLEX: 60 NG/L
GLOBULIN UR ELPH-MCNC: 3.3 GM/DL
GLUCOSE BLDC GLUCOMTR-MCNC: 166 MG/DL (ref 70–99)
GLUCOSE SERPL-MCNC: 120 MG/DL (ref 65–99)
HBA1C MFR BLD: 7.5 % (ref 4.8–5.6)
HCT VFR BLD AUTO: 43.8 % (ref 37.5–51)
HGB BLD-MCNC: 14.5 G/DL (ref 13–17.7)
HOLD SPECIMEN: NORMAL
HOLD SPECIMEN: NORMAL
IMM GRANULOCYTES # BLD AUTO: 0.02 10*3/MM3 (ref 0–0.05)
IMM GRANULOCYTES NFR BLD AUTO: 0.3 % (ref 0–0.5)
LYMPHOCYTES # BLD AUTO: 1.93 10*3/MM3 (ref 0.7–3.1)
LYMPHOCYTES NFR BLD AUTO: 24.4 % (ref 19.6–45.3)
MAGNESIUM SERPL-MCNC: 2.1 MG/DL (ref 1.6–2.4)
MCH RBC QN AUTO: 28.7 PG (ref 26.6–33)
MCHC RBC AUTO-ENTMCNC: 33.1 G/DL (ref 31.5–35.7)
MCV RBC AUTO: 86.6 FL (ref 79–97)
MONOCYTES # BLD AUTO: 0.8 10*3/MM3 (ref 0.1–0.9)
MONOCYTES NFR BLD AUTO: 10.1 % (ref 5–12)
NEUTROPHILS NFR BLD AUTO: 5.11 10*3/MM3 (ref 1.7–7)
NEUTROPHILS NFR BLD AUTO: 64.5 % (ref 42.7–76)
NRBC BLD AUTO-RTO: 0 /100 WBC (ref 0–0.2)
NT-PROBNP SERPL-MCNC: 2564 PG/ML (ref 0–1800)
PLATELET # BLD AUTO: 243 10*3/MM3 (ref 140–450)
PMV BLD AUTO: 9.5 FL (ref 6–12)
POTASSIUM SERPL-SCNC: 4.1 MMOL/L (ref 3.5–5.2)
PROT SERPL-MCNC: 7.6 G/DL (ref 6–8.5)
QT INTERVAL: 328 MS
RBC # BLD AUTO: 5.06 10*6/MM3 (ref 4.14–5.8)
SODIUM SERPL-SCNC: 140 MMOL/L (ref 136–145)
TROPONIN T DELTA: -7 NG/L
TROPONIN T SERPL HS-MCNC: 67 NG/L
TSH SERPL DL<=0.05 MIU/L-ACNC: 0.39 UIU/ML (ref 0.27–4.2)
WBC NRBC COR # BLD: 7.92 10*3/MM3 (ref 3.4–10.8)
WHOLE BLOOD HOLD COAG: NORMAL
WHOLE BLOOD HOLD SPECIMEN: NORMAL

## 2023-08-03 PROCEDURE — 84443 ASSAY THYROID STIM HORMONE: CPT | Performed by: HOSPITALIST

## 2023-08-03 PROCEDURE — 94799 UNLISTED PULMONARY SVC/PX: CPT

## 2023-08-03 PROCEDURE — 93010 ELECTROCARDIOGRAM REPORT: CPT | Performed by: INTERNAL MEDICINE

## 2023-08-03 PROCEDURE — 83880 ASSAY OF NATRIURETIC PEPTIDE: CPT | Performed by: EMERGENCY MEDICINE

## 2023-08-03 PROCEDURE — 85025 COMPLETE CBC W/AUTO DIFF WBC: CPT

## 2023-08-03 PROCEDURE — 63710000001 INSULIN LISPRO (HUMAN) PER 5 UNITS: Performed by: HOSPITALIST

## 2023-08-03 PROCEDURE — 80053 COMPREHEN METABOLIC PANEL: CPT | Performed by: EMERGENCY MEDICINE

## 2023-08-03 PROCEDURE — 83735 ASSAY OF MAGNESIUM: CPT | Performed by: EMERGENCY MEDICINE

## 2023-08-03 PROCEDURE — 99285 EMERGENCY DEPT VISIT HI MDM: CPT

## 2023-08-03 PROCEDURE — 25010000002 FUROSEMIDE PER 20 MG: Performed by: EMERGENCY MEDICINE

## 2023-08-03 PROCEDURE — 71045 X-RAY EXAM CHEST 1 VIEW: CPT

## 2023-08-03 PROCEDURE — 99214 OFFICE O/P EST MOD 30 MIN: CPT

## 2023-08-03 PROCEDURE — 84484 ASSAY OF TROPONIN QUANT: CPT | Performed by: EMERGENCY MEDICINE

## 2023-08-03 PROCEDURE — 82948 REAGENT STRIP/BLOOD GLUCOSE: CPT

## 2023-08-03 PROCEDURE — 93005 ELECTROCARDIOGRAM TRACING: CPT | Performed by: EMERGENCY MEDICINE

## 2023-08-03 PROCEDURE — 93005 ELECTROCARDIOGRAM TRACING: CPT

## 2023-08-03 PROCEDURE — 25010000002 METHYLPREDNISOLONE PER 125 MG: Performed by: EMERGENCY MEDICINE

## 2023-08-03 PROCEDURE — 94640 AIRWAY INHALATION TREATMENT: CPT

## 2023-08-03 PROCEDURE — 83036 HEMOGLOBIN GLYCOSYLATED A1C: CPT | Performed by: HOSPITALIST

## 2023-08-03 RX ORDER — LANCETS
EACH MISCELLANEOUS
COMMUNITY

## 2023-08-03 RX ORDER — CETIRIZINE HYDROCHLORIDE 10 MG/1
10 TABLET ORAL DAILY
Status: DISCONTINUED | OUTPATIENT
Start: 2023-08-04 | End: 2023-08-06 | Stop reason: HOSPADM

## 2023-08-03 RX ORDER — METHYLPREDNISOLONE SODIUM SUCCINATE 125 MG/2ML
125 INJECTION, POWDER, LYOPHILIZED, FOR SOLUTION INTRAMUSCULAR; INTRAVENOUS ONCE
Status: COMPLETED | OUTPATIENT
Start: 2023-08-03 | End: 2023-08-03

## 2023-08-03 RX ORDER — BUDESONIDE 0.5 MG/2ML
0.5 INHALANT ORAL
Status: DISCONTINUED | OUTPATIENT
Start: 2023-08-03 | End: 2023-08-06 | Stop reason: HOSPADM

## 2023-08-03 RX ORDER — INSULIN GLARGINE-YFGN 100 [IU]/ML
INJECTION, SOLUTION SUBCUTANEOUS
COMMUNITY
End: 2023-08-03

## 2023-08-03 RX ORDER — IPRATROPIUM BROMIDE AND ALBUTEROL SULFATE 2.5; .5 MG/3ML; MG/3ML
3 SOLUTION RESPIRATORY (INHALATION) EVERY 4 HOURS PRN
Status: DISCONTINUED | OUTPATIENT
Start: 2023-08-03 | End: 2023-08-03

## 2023-08-03 RX ORDER — SODIUM CHLORIDE 0.9 % (FLUSH) 0.9 %
10 SYRINGE (ML) INJECTION AS NEEDED
Status: DISCONTINUED | OUTPATIENT
Start: 2023-08-03 | End: 2023-08-06 | Stop reason: HOSPADM

## 2023-08-03 RX ORDER — KETOTIFEN FUMARATE 0.35 MG/ML
1 SOLUTION/ DROPS OPHTHALMIC 2 TIMES DAILY
Status: DISCONTINUED | OUTPATIENT
Start: 2023-08-03 | End: 2023-08-06 | Stop reason: HOSPADM

## 2023-08-03 RX ORDER — IPRATROPIUM BROMIDE AND ALBUTEROL SULFATE 2.5; .5 MG/3ML; MG/3ML
3 SOLUTION RESPIRATORY (INHALATION) EVERY 4 HOURS PRN
Status: DISCONTINUED | OUTPATIENT
Start: 2023-08-03 | End: 2023-08-06

## 2023-08-03 RX ORDER — CETIRIZINE HYDROCHLORIDE 10 MG/1
10 TABLET ORAL DAILY
COMMUNITY

## 2023-08-03 RX ORDER — MAGNESIUM SULFATE HEPTAHYDRATE 40 MG/ML
2 INJECTION, SOLUTION INTRAVENOUS ONCE
Status: COMPLETED | OUTPATIENT
Start: 2023-08-03 | End: 2023-08-04

## 2023-08-03 RX ORDER — ACETAMINOPHEN 325 MG/1
650 TABLET ORAL EVERY 4 HOURS PRN
Status: DISCONTINUED | OUTPATIENT
Start: 2023-08-03 | End: 2023-08-04

## 2023-08-03 RX ORDER — ARFORMOTEROL TARTRATE 15 UG/2ML
15 SOLUTION RESPIRATORY (INHALATION)
Status: DISCONTINUED | OUTPATIENT
Start: 2023-08-03 | End: 2023-08-06 | Stop reason: HOSPADM

## 2023-08-03 RX ORDER — AMOXICILLIN 250 MG
2 CAPSULE ORAL 2 TIMES DAILY
Status: DISCONTINUED | OUTPATIENT
Start: 2023-08-03 | End: 2023-08-06 | Stop reason: HOSPADM

## 2023-08-03 RX ORDER — ATORVASTATIN CALCIUM 10 MG/1
10 TABLET, FILM COATED ORAL DAILY
Status: DISCONTINUED | OUTPATIENT
Start: 2023-08-03 | End: 2023-08-03

## 2023-08-03 RX ORDER — ENOXAPARIN SODIUM 150 MG/ML
1 INJECTION SUBCUTANEOUS EVERY 12 HOURS
Status: DISCONTINUED | OUTPATIENT
Start: 2023-08-03 | End: 2023-08-04

## 2023-08-03 RX ORDER — DONEPEZIL HYDROCHLORIDE 5 MG/1
5 TABLET, FILM COATED ORAL NIGHTLY
Status: DISCONTINUED | OUTPATIENT
Start: 2023-08-03 | End: 2023-08-03

## 2023-08-03 RX ORDER — HYDROCODONE BITARTRATE AND ACETAMINOPHEN 7.5; 325 MG/1; MG/1
2 TABLET ORAL EVERY 4 HOURS PRN
Status: DISCONTINUED | OUTPATIENT
Start: 2023-08-03 | End: 2023-08-04

## 2023-08-03 RX ORDER — TRAZODONE HYDROCHLORIDE 50 MG/1
50 TABLET ORAL NIGHTLY
COMMUNITY
End: 2023-08-03

## 2023-08-03 RX ORDER — DILTIAZEM HCL IN NACL,ISO-OSM 125 MG/125
5-15 PLASTIC BAG, INJECTION (ML) INTRAVENOUS
Status: DISCONTINUED | OUTPATIENT
Start: 2023-08-03 | End: 2023-08-04

## 2023-08-03 RX ORDER — SODIUM CHLORIDE 0.9 % (FLUSH) 0.9 %
10 SYRINGE (ML) INJECTION EVERY 12 HOURS SCHEDULED
Status: DISCONTINUED | OUTPATIENT
Start: 2023-08-03 | End: 2023-08-06 | Stop reason: HOSPADM

## 2023-08-03 RX ORDER — KETOTIFEN FUMARATE 0.35 MG/ML
1 SOLUTION/ DROPS OPHTHALMIC 2 TIMES DAILY
COMMUNITY

## 2023-08-03 RX ORDER — METHYLPREDNISOLONE SODIUM SUCCINATE 40 MG/ML
40 INJECTION, POWDER, LYOPHILIZED, FOR SOLUTION INTRAMUSCULAR; INTRAVENOUS EVERY 12 HOURS
Status: DISCONTINUED | OUTPATIENT
Start: 2023-08-04 | End: 2023-08-04

## 2023-08-03 RX ORDER — ACETAMINOPHEN 160 MG/5ML
650 SOLUTION ORAL EVERY 4 HOURS PRN
Status: DISCONTINUED | OUTPATIENT
Start: 2023-08-03 | End: 2023-08-04

## 2023-08-03 RX ORDER — VALSARTAN 160 MG/1
160 TABLET ORAL DAILY
COMMUNITY
End: 2023-08-06 | Stop reason: HOSPADM

## 2023-08-03 RX ORDER — BISACODYL 5 MG/1
5 TABLET, DELAYED RELEASE ORAL DAILY PRN
Status: DISCONTINUED | OUTPATIENT
Start: 2023-08-03 | End: 2023-08-06 | Stop reason: HOSPADM

## 2023-08-03 RX ORDER — POLYETHYLENE GLYCOL 3350 17 G/17G
17 POWDER, FOR SOLUTION ORAL DAILY PRN
Status: DISCONTINUED | OUTPATIENT
Start: 2023-08-03 | End: 2023-08-04

## 2023-08-03 RX ORDER — CHOLECALCIFEROL (VITAMIN D3) 125 MCG
5 CAPSULE ORAL NIGHTLY PRN
Status: DISCONTINUED | OUTPATIENT
Start: 2023-08-03 | End: 2023-08-04

## 2023-08-03 RX ORDER — DEXTROSE MONOHYDRATE 25 G/50ML
25 INJECTION, SOLUTION INTRAVENOUS
Status: DISCONTINUED | OUTPATIENT
Start: 2023-08-03 | End: 2023-08-06 | Stop reason: HOSPADM

## 2023-08-03 RX ORDER — ACETAMINOPHEN 650 MG/1
650 SUPPOSITORY RECTAL EVERY 4 HOURS PRN
Status: DISCONTINUED | OUTPATIENT
Start: 2023-08-03 | End: 2023-08-04

## 2023-08-03 RX ORDER — HYDROCODONE BITARTRATE AND ACETAMINOPHEN 5; 325 MG/1; MG/1
1 TABLET ORAL EVERY 4 HOURS PRN
Status: DISCONTINUED | OUTPATIENT
Start: 2023-08-03 | End: 2023-08-04

## 2023-08-03 RX ORDER — ONDANSETRON 2 MG/ML
4 INJECTION INTRAMUSCULAR; INTRAVENOUS EVERY 6 HOURS PRN
Status: DISCONTINUED | OUTPATIENT
Start: 2023-08-03 | End: 2023-08-03

## 2023-08-03 RX ORDER — ATORVASTATIN CALCIUM 40 MG/1
40 TABLET, FILM COATED ORAL NIGHTLY
Status: DISCONTINUED | OUTPATIENT
Start: 2023-08-03 | End: 2023-08-06 | Stop reason: HOSPADM

## 2023-08-03 RX ORDER — INSULIN LISPRO 100 [IU]/ML
4-24 INJECTION, SOLUTION INTRAVENOUS; SUBCUTANEOUS
Status: DISCONTINUED | OUTPATIENT
Start: 2023-08-03 | End: 2023-08-06 | Stop reason: HOSPADM

## 2023-08-03 RX ORDER — ATORVASTATIN CALCIUM 40 MG/1
40 TABLET, FILM COATED ORAL DAILY
COMMUNITY

## 2023-08-03 RX ORDER — IPRATROPIUM BROMIDE AND ALBUTEROL SULFATE 2.5; .5 MG/3ML; MG/3ML
3 SOLUTION RESPIRATORY (INHALATION) ONCE
Status: COMPLETED | OUTPATIENT
Start: 2023-08-03 | End: 2023-08-03

## 2023-08-03 RX ORDER — NICOTINE POLACRILEX 4 MG
15 LOZENGE BUCCAL
Status: DISCONTINUED | OUTPATIENT
Start: 2023-08-03 | End: 2023-08-06 | Stop reason: HOSPADM

## 2023-08-03 RX ORDER — DONEPEZIL HYDROCHLORIDE 5 MG/1
5 TABLET, FILM COATED ORAL NIGHTLY
COMMUNITY

## 2023-08-03 RX ORDER — NALOXONE HCL 0.4 MG/ML
0.4 VIAL (ML) INJECTION
Status: DISCONTINUED | OUTPATIENT
Start: 2023-08-03 | End: 2023-08-04

## 2023-08-03 RX ORDER — SODIUM CHLORIDE 9 MG/ML
40 INJECTION, SOLUTION INTRAVENOUS AS NEEDED
Status: DISCONTINUED | OUTPATIENT
Start: 2023-08-03 | End: 2023-08-06 | Stop reason: HOSPADM

## 2023-08-03 RX ORDER — MORPHINE SULFATE 2 MG/ML
1 INJECTION, SOLUTION INTRAMUSCULAR; INTRAVENOUS EVERY 4 HOURS PRN
Status: DISCONTINUED | OUTPATIENT
Start: 2023-08-03 | End: 2023-08-04

## 2023-08-03 RX ORDER — BISACODYL 10 MG
10 SUPPOSITORY, RECTAL RECTAL DAILY PRN
Status: DISCONTINUED | OUTPATIENT
Start: 2023-08-03 | End: 2023-08-06 | Stop reason: HOSPADM

## 2023-08-03 RX ORDER — FLUOXETINE HYDROCHLORIDE 20 MG/1
20 CAPSULE ORAL EVERY MORNING
COMMUNITY

## 2023-08-03 RX ORDER — FLUOXETINE 10 MG/1
10 CAPSULE ORAL EVERY MORNING
COMMUNITY
End: 2023-08-06 | Stop reason: HOSPADM

## 2023-08-03 RX ORDER — INSULIN GLARGINE 100 [IU]/ML
35 INJECTION, SOLUTION SUBCUTANEOUS NIGHTLY
COMMUNITY

## 2023-08-03 RX ORDER — ONDANSETRON 4 MG/1
4 TABLET, FILM COATED ORAL EVERY 6 HOURS PRN
Status: DISCONTINUED | OUTPATIENT
Start: 2023-08-03 | End: 2023-08-03

## 2023-08-03 RX ORDER — ALBUTEROL SULFATE 90 UG/1
2 AEROSOL, METERED RESPIRATORY (INHALATION) EVERY 4 HOURS PRN
COMMUNITY

## 2023-08-03 RX ORDER — HYDROCHLOROTHIAZIDE 25 MG/1
25 TABLET ORAL DAILY
COMMUNITY
End: 2023-08-06 | Stop reason: HOSPADM

## 2023-08-03 RX ORDER — FUROSEMIDE 10 MG/ML
40 INJECTION INTRAMUSCULAR; INTRAVENOUS ONCE
Status: COMPLETED | OUTPATIENT
Start: 2023-08-03 | End: 2023-08-03

## 2023-08-03 RX ADMIN — METOPROLOL TARTRATE 5 MG: 1 INJECTION, SOLUTION INTRAVENOUS at 17:48

## 2023-08-03 RX ADMIN — DILTIAZEM HYDROCHLORIDE 2.5 MG/HR: 5 INJECTION, SOLUTION INTRAVENOUS at 18:48

## 2023-08-03 RX ADMIN — KETOTIFEN FUMARATE 1 DROP: 0.25 SOLUTION/ DROPS OPHTHALMIC at 23:27

## 2023-08-03 RX ADMIN — BUDESONIDE 0.5 MG: 0.5 SUSPENSION RESPIRATORY (INHALATION) at 19:11

## 2023-08-03 RX ADMIN — IPRATROPIUM BROMIDE AND ALBUTEROL SULFATE 3 ML: 2.5; .5 SOLUTION RESPIRATORY (INHALATION) at 17:01

## 2023-08-03 RX ADMIN — FUROSEMIDE 40 MG: 10 INJECTION, SOLUTION INTRAMUSCULAR; INTRAVENOUS at 16:42

## 2023-08-03 RX ADMIN — ARFORMOTEROL TARTRATE 15 MCG: 15 SOLUTION RESPIRATORY (INHALATION) at 19:11

## 2023-08-03 RX ADMIN — METHYLPREDNISOLONE SODIUM SUCCINATE 125 MG: 125 INJECTION INTRAMUSCULAR; INTRAVENOUS at 16:40

## 2023-08-03 RX ADMIN — Medication 10 ML: at 23:28

## 2023-08-03 RX ADMIN — INSULIN LISPRO 4 UNITS: 100 INJECTION, SOLUTION INTRAVENOUS; SUBCUTANEOUS at 23:26

## 2023-08-03 NOTE — ED PROVIDER NOTES
Time: 5:19 PM EDT  Date of encounter:  8/3/2023  Independent Historian/Clinical History and Information was obtained by:   Patient and Family    History is limited by:  Vague historian    Chief Complaint: Exertional dyspnea, edema      History of Present Illness:  Patient is a 77 y.o. year old male who presents to the emergency department for evaluation of exertional dyspnea, orthopnea and increasing lower extremity edema.  Chronic issue but patient states much worse in the past 1 to 2 weeks.  Carries no formal diagnosis of CAD, CHF.  States he may be on a water pill but cannot recall the name.  No history of heart arrhythmia.  In the last 2 days patient reports no cough.  He denies chest pain.  Some vague description of subjective fever but not consistent.  No vomiting, diarrhea or any other symptoms.    HPI    Patient Care Team  Primary Care Provider: Torres Acevedo MD    Past Medical History:     No Known Allergies  Past Medical History:   Diagnosis Date    Asthma, intrinsic     PTSD (post-traumatic stress disorder)      History reviewed. No pertinent surgical history.  Family History   Family history unknown: Yes       Home Medications:  Prior to Admission medications    Medication Sig Start Date End Date Taking? Authorizing Provider   Accu-Chek Softclix Lancets lancets by Other route. Use as instructed    Robson Marte MD   ALBUTEROL IN Inhale.    ProviderRobson MD   arformoterol (Brovana) 15 MCG/2ML nebulizer solution Take 2 mL by nebulization 2 (Two) Times a Day. 3/6/23   Darren Silvestre DO   atorvastatin (LIPITOR) 40 MG tablet Take 1 tablet by mouth Daily.    ProviderRobson MD   budesonide (Pulmicort) 0.5 MG/2ML nebulizer solution Take 2 mL by nebulization Daily. 3/6/23   Darren Silvestre DO   cetirizine (zyrTEC) 10 MG tablet Take 1 tablet by mouth Daily.    Robson Marte MD   donepezil (ARICEPT) 5 MG tablet Take 1 tablet by mouth Every Night.     Robson Marte MD   EPINEPHrine (EPIPEN) 0.3 MG/0.3ML solution auto-injector injection  3/13/23   Robson Marte MD   FLUoxetine (PROzac) 10 MG capsule Take 1 capsule by mouth Daily.    Robson Marte MD   FLUoxetine (PROzac) 20 MG capsule Take 1 capsule by mouth Daily.    Robson Marte MD   furosemide (LASIX) 20 MG tablet  12/22/22   Robson Marte MD   hydroCHLOROthiazide (HYDRODIURIL) 25 MG tablet Take 1 tablet by mouth Daily.    Robson Marte MD   Insulin Glargine-yfgn (SEMGLEE-YFGN) 100 UNIT/ML Inject  under the skin into the appropriate area as directed.    Robson Marte MD   ipratropium-albuterol (DUO-NEB) 0.5-2.5 mg/3 ml nebulizer Take 3 mL by nebulization Every 4 (Four) Hours As Needed for Wheezing.    Robson Marte MD   ketotifen (ZADITOR) 0.025 % ophthalmic solution 1 drop 2 (Two) Times a Day.    Robson Marte MD   Mepolizumab (Nucala) 100 MG/ML solution prefilled syringe Inject 1 mL under the skin into the appropriate area as directed Every 28 (Twenty-Eight) Days. 4/11/23   Darren Silvestre DO   metFORMIN (GLUCOPHAGE) 1000 MG tablet Take 1 tablet by mouth.    Robson Marte MD   Omega-3 Fatty Acids (fish oil) 1000 MG capsule capsule Take  by mouth Daily With Breakfast.    Robson Marte MD   potassium chloride (K-DUR,KLOR-CON) 10 MEQ CR tablet  12/22/22   Robson Marte MD   revefenacin (Yupelri) 175 MCG/3ML nebulizer solution Take 3 mL by nebulization Daily. 3/6/23   Darren Silvestre DO   simvastatin (ZOCOR) 20 MG tablet Take 1 tablet by mouth.  Patient not taking: Reported on 8/3/2023    Robson Marte MD   tiotropium bromide-olodaterol (STIOLTO RESPIMAT) 2.5-2.5 MCG/ACT aerosol solution inhaler Inhale Daily.    Robson Marte MD   traZODone (DESYREL) 50 MG tablet Take 1 tablet by mouth Every Night.    Robson Marte MD   valsartan (DIOVAN) 160 MG tablet Take 1 tablet by  "mouth Daily.    Provider, Historical, MD   valsartan-hydrochlorothiazide (DIOVAN-HCT) 160-12.5 MG per tablet Take 1 tablet by mouth Daily.    Provider, Historical, MD        Social History:   Social History     Tobacco Use    Smoking status: Never     Passive exposure: Never    Smokeless tobacco: Never   Vaping Use    Vaping Use: Never used   Substance Use Topics    Alcohol use: Never    Drug use: Never         Review of Systems:  Review of Systems   Constitutional:  Negative for chills and fever.   HENT:  Negative for congestion, rhinorrhea and sore throat.    Eyes:  Negative for photophobia.   Respiratory:  Positive for shortness of breath. Negative for apnea, cough and chest tightness.    Cardiovascular:  Positive for leg swelling. Negative for chest pain and palpitations.   Gastrointestinal:  Negative for abdominal pain, diarrhea, nausea and vomiting.   Endocrine: Negative.    Genitourinary:  Negative for difficulty urinating and dysuria.   Musculoskeletal:  Negative for back pain, joint swelling and myalgias.   Skin:  Negative for color change and wound.   Allergic/Immunologic: Negative.    Neurological:  Negative for seizures and headaches.   Psychiatric/Behavioral: Negative.     All other systems reviewed and are negative.     Physical Exam:  /91   Pulse (!) 126   Temp 98 øF (36.7 øC) (Oral)   Resp 17   Ht 172.7 cm (68\")   SpO2 99%   BMI 38.82 kg/mý     Physical Exam  Vitals and nursing note reviewed.   Constitutional:       General: He is awake.      Appearance: Normal appearance.   HENT:      Head: Normocephalic and atraumatic.      Nose: Nose normal.      Mouth/Throat:      Mouth: Mucous membranes are moist.   Eyes:      Extraocular Movements: Extraocular movements intact.      Pupils: Pupils are equal, round, and reactive to light.   Cardiovascular:      Rate and Rhythm: Normal rate and regular rhythm.      Heart sounds: Normal heart sounds.   Pulmonary:      Effort: Pulmonary effort is " normal. No respiratory distress.      Breath sounds: Examination of the right-lower field reveals decreased breath sounds. Examination of the left-lower field reveals decreased breath sounds. Decreased breath sounds present. No wheezing, rhonchi or rales.   Abdominal:      General: Bowel sounds are normal.      Palpations: Abdomen is soft.      Tenderness: There is no abdominal tenderness. There is no guarding or rebound.      Comments: No rigidity   Musculoskeletal:         General: No tenderness. Normal range of motion.      Cervical back: Normal range of motion and neck supple.      Right lower leg: Edema present.      Left lower leg: Edema present.   Skin:     General: Skin is warm and dry.      Coloration: Skin is not jaundiced.   Neurological:      General: No focal deficit present.      Mental Status: He is alert and oriented to person, place, and time. Mental status is at baseline.      Sensory: Sensation is intact.      Motor: Motor function is intact.      Coordination: Coordination is intact.   Psychiatric:         Attention and Perception: Attention and perception normal.         Mood and Affect: Mood and affect normal.         Speech: Speech normal.         Behavior: Behavior normal.         Judgment: Judgment normal.                Procedures:  Procedures      Medical Decision Making:      Comorbidities that affect care:    Asthma, COPD, hypertension, sleep apnea, diabetes    External Notes reviewed:    Encounter review: Office visit today with pulmonology for dyspnea.    Impression and Plan     -Patient agreeable to go to [facility] ED via private vehicle due to acute respiratory distress, severe swelling of bilateral lower extremities, and suspected volume overload.  ED nurse notified, will be expecting patient upon arrival.  He will likely require IV diuretics, close monitoring of I&Os, and urgent workup that cannot be achieved in an outpatient setting.  -HRCT 3/6/2023 showed diffuse  peribronchial thickening could be related to bronchitis, minor areas of linear scarring, and evidence of prior granulomatous infection  -PFT 3/6/2023 showed severe obstructive airway disease FEV1 32% predicted, very significant response to bronchodilator with 38% improvement in FEV1, air trapping, and reduced DLCO  -Peripheral eosinophilia 540, elevated IgE 98.5 on 3/6/2023  -Continue using Brovana and Pulmicort daily as prescribed, reminded patient to rinse mouth after use  -Continue using Yupelri daily as prescribed  -Continue using DuoNebs and albuterol rescue inhaler as needed  -Continue Nucala injections monthly as prescribed, autoinjector given to patient in clinic today to self-administer at home.  Patient educated that he will need to get these from his pharmacy or hospital going forward, we can no longer continue giving him his medication in the clinic.  -Continue wearing oxygen 2 L at night, last 6-minute walk test done 4/11/2023 patient did not qualify for continuous daytime oxygen or portable oxygen concentrator  -Spent 5 minutes counseling patient on diet and exercise.  Recommended a low-fat, low-calorie diet.  Also recommend 30 minutes of daily exercise.  Patient verbalizes understanding.  -Patient to follow up after ED visit, also has regularly scheduled appointment with Elizabeth 9/12/2023      The following orders were placed and all results were independently analyzed by me:  Orders Placed This Encounter   Procedures    XR Chest 1 View    [place] Draw    Comprehensive Metabolic Panel    BNP    Single High Sensitivity Troponin T    CBC Auto Differential    High Sensitivity Troponin T 2Hr    Magnesium    NPO Diet NPO Type: Strict NPO    Undress & Gown    Continuous Pulse Oximetry    Vital Signs    Hospitalist (on-call MD unless specified)    Cardiology (on-call MD unless specified)    Oxygen Therapy- Nasal Cannula; Titrate 1-6 LPM Per SpO2; 90 - 95%    ECG 12 Lead ED Triage Standing Order; SOA    ECG  12 Lead Rhythm Change    Insert Peripheral IV    CBC & Differential    Green Top (Gel)    Lavender Top    Gold Top - SST    Light Blue Top       Medications Given in the Emergency Department:  Medications   sodium chloride 0.9 % flush 10 mL (has no administration in time range)   dilTIAZem (CARDIZEM) 125 mg in 125 mL sodium chloride  infusion (has no administration in time range)   ipratropium-albuterol (DUO-NEB) nebulizer solution 3 mL (3 mL Nebulization Given 8/3/23 1701)   methylPREDNISolone sodium succinate (SOLU-Medrol) injection 125 mg (125 mg Intravenous Given 8/3/23 1640)   furosemide (LASIX) injection 40 mg (40 mg Intravenous Given 8/3/23 1642)   metoprolol tartrate (LOPRESSOR) injection 5 mg (5 mg Intravenous Given 8/3/23 1748)        ED Course:    ED Course as of 08/03/23 1755   Thu Aug 03, 2023   1714 I have personally interpreted the EKG today and it shows no evidence of any acute ischemia.  Questionable sinus tachycardia versus junctional rhythm versus atrial flutter. [RP]      ED Course User Index  [RP] Brain Smith MD       Labs:    Lab Results (last 24 hours)       Procedure Component Value Units Date/Time    CBC & Differential [[id number]]  (Abnormal) Collected: 08/03/23 1519    Specimen: Blood Updated: 08/03/23 1527    Narrative:      The following orders were created for panel order CBC & Differential.  Procedure                               Abnormality         Status                     ---------                               -----------         ------                     CBC Auto Differential[[id number]]        Abnormal            Final result                 Please view results for these tests on the individual orders.    Comprehensive Metabolic Panel [[id number]]  (Abnormal) Collected: 08/03/23 1519    Specimen: Blood Updated: 08/03/23 1553     Glucose 120 mg/dL      BUN 28 mg/dL      Creatinine 1.65 mg/dL      Sodium 140 mmol/L      Potassium 4.1 mmol/L      Chloride 102 mmol/L      CO2  25.5 mmol/L      Calcium 9.7 mg/dL      Total Protein 7.6 g/dL      Albumin 4.3 g/dL      ALT (SGPT) 158 U/L      AST (SGOT) 78 U/L      Alkaline Phosphatase 123 U/L      Total Bilirubin 0.7 mg/dL      Globulin 3.3 gm/dL      A/G Ratio 1.3 g/dL      BUN/Creatinine Ratio 17.0     Anion Gap 12.5 mmol/L      eGFR 43.0 mL/min/1.73     Narrative:      GFR Normal >60  Chronic Kidney Disease <60  Kidney Failure <15    The GFR formula is only valid for adults with stable renal function between ages 18 and 70.    BNP [[id number]]  (Abnormal) Collected: 08/03/23 1519    Specimen: Blood Updated: 08/03/23 1549     proBNP 2,564.0 pg/mL     Narrative:      Among patients with dyspnea, NT-proBNP is highly sensitive for the detection of acute congestive heart failure. In addition NT-proBNP of <300 pg/ml effectively rules out acute congestive heart failure with 99% negative predictive value.      Single High Sensitivity Troponin T [[id number]]  (Abnormal) Collected: 08/03/23 1519    Specimen: Blood Updated: 08/03/23 1602     HS Troponin T 67 ng/L     Narrative:      High Sensitive Troponin T Reference Range:  <10.0 ng/L- Negative Female for AMI  <15.0 ng/L- Negative Male for AMI  >=10 - Abnormal Female indicating possible myocardial injury.  >=15 - Abnormal Male indicating possible myocardial injury.   Clinicians would have to utilize clinical acumen, EKG, Troponin, and serial changes to determine if it is an Acute Myocardial Infarction or myocardial injury due to an underlying chronic condition.         CBC Auto Differential [[id number]]  (Abnormal) Collected: 08/03/23 1519    Specimen: Blood Updated: 08/03/23 1527     WBC 7.92 10*3/mm3      RBC 5.06 10*6/mm3      Hemoglobin 14.5 g/dL      Hematocrit 43.8 %      MCV 86.6 fL      MCH 28.7 pg      MCHC 33.1 g/dL      RDW 17.3 %      RDW-SD 53.3 fl      MPV 9.5 fL      Platelets 243 10*3/mm3      Neutrophil % 64.5 %      Lymphocyte % 24.4 %      Monocyte % 10.1 %      Eosinophil % 0.1 %       Basophil % 0.6 %      Immature Grans % 0.3 %      Neutrophils, Absolute 5.11 10*3/mm3      Lymphocytes, Absolute 1.93 10*3/mm3      Monocytes, Absolute 0.80 10*3/mm3      Eosinophils, Absolute 0.01 10*3/mm3      Basophils, Absolute 0.05 10*3/mm3      Immature Grans, Absolute 0.02 10*3/mm3      nRBC 0.0 /100 WBC     Magnesium [[id number]]  (Normal) Collected: 08/03/23 1519    Specimen: Blood Updated: 08/03/23 1636     Magnesium 2.1 mg/dL              Imaging:    XR Chest 1 View    Result Date: 8/3/2023  PROCEDURE: XR CHEST 1 VW  COMPARISON: None  INDICATIONS: SOB  FINDINGS:  Lordotic positioning.  Heart size and pulmonary vessels within normal limits.  Lungs clear.  Costophrenic angles sharp       No active cardiopulmonary disease       MICHAEL SALGADO MD       Electronically Signed and Approved By: MICHAEL SALGADO MD on 8/03/2023 at 16:04                Differential Diagnosis and Discussion:    Dyspnea: Differential diagnosis includes but is not limited to metabolic acidosis, neurological disorders, psychogenic, asthma, pneumothorax, upper airway obstruction, COPD, pneumonia, noncardiogenic pulmonary edema, interstitial lung disease, anemia, congestive heart failure, and pulmonary embolism    All labs were reviewed and interpreted by me.  All X-rays impressions were independently interpreted by me.  EKG was interpreted by me.    MDM     Amount and/or Complexity of Data Reviewed  Decide to obtain previous medical records or to obtain history from someone other than the patient: yes         Critical Care Note: Total Critical Care time of 40 minutes. Total critical care time documented does not include time spent on separately billed procedures for services of nurses or physician assistants. I personally saw and examined the patient. I have reviewed all diagnostic interpretations and treatment plans as written. I was present for the key portions of any procedures performed and the inclusive time noted in any critical  care statement. Critical care time includes patient management by me, time spent at the patients bedside,  time to review lab and imaging results, discussing patient care, documentation in the medical record, and time spent with family or caregiver.    Patient Care Considerations:    CT CHEST: I considered ordering a CT scan of the chest, however patient denies chest pain      Consultants/Shared Management Plan:    Hospitalist: I have discussed the case with Dr. Resendiz who agrees to accept the patient for admission.  Consultant: I have discussed the case with cardiology, Dr. Lafleur who agrees to consult on the patient.    Social Determinants of Health:    Patient is independent, reliable, and has access to care.       Disposition and Care Coordination:    Admit:   Through independent evaluation of the patient's history, physical, and imperical data, the patient meets criteria for observation/admission to the hospital.        Final diagnoses:   Acute congestive heart failure, unspecified heart failure type   Atrial flutter, unspecified type        ED Disposition       ED Disposition   Decision to Admit    Condition   --    Comment   --               This medical record created using voice recognition software.             Brain Smith MD  08/03/23 0963

## 2023-08-03 NOTE — H&P
[facility] HISTORY AND PHYSICAL  Date: 8/3/2023   Patient Name: Andrew Hart  : 1945  MRN: [id number]  Primary Care Physician:  Torres Acevedo MD  Date of admission: 8/3/2023    Subjective exertional dyspnea and edema  Subjective   Chief Complaint: Exertional dyspnea and edema    HPI: Patient is a 77-year-old male that presents to the emergency room for evaluation of exertional dyspnea, orthopnea and worsening lower extremity edema.  The symptoms have been going on for the past 2 weeks.  Patient has no history of CAD, CHF or A-fib.  Patient is a poor historian.    He does follow-up with Dr. Philip Ceja ([facility]) for shortness of breath.  According to the note in Care Everywhere: He had an echo in  and a stress test.  The stress test showed decreased tracer uptake in the inferior wall with possible mild ischemia in the inferior and inferior septal hypokinesis with a EF of 38%.  An echo showed mild to moderate left ventricular hypertrophy with hypokinesis of the inferior and mid inferior lateral wall of the left ventricle ejection fraction of 47% with left atrial enlargement.  Follow-up right and left heart cath in 2021 showed mild pulmonary hypertension with increased pulmonary capillary wedge pressures of 18 mmHg.  There is mild to moderate global hypokinesis of the left ventricle ejection fraction of 45-50 with no significant coronary artery disease there is mild dilatation of the aortic root.    On arrival to the ED, patient had temperature of 98, pulse of 127, respiratory rate of 18, blood pressure 117/98, and he saturating 98% on room air.  Patient's magnesium is 2.1.  White blood cell count is 7.92.  High-sensitivity troponin is 67 initially second high-sensitivity troponin is 60 with a delta of -7.  Creatinine is 1.65 (baseline creatinine is 1.24), liver enzymes are elevated.      Chest x-ray there is no active cardiopulmonary disease.  Personal  History     Past Medical History:  Past Medical History:   Diagnosis Date    Asthma, intrinsic     PTSD (post-traumatic stress disorder)          Past Surgical History:  History reviewed. No pertinent surgical history.     Family History:   Family history is unknown by patient.      Social History:   Social History     Socioeconomic History    Marital status:    Tobacco Use    Smoking status: Never     Passive exposure: Never    Smokeless tobacco: Never   Vaping Use    Vaping Use: Never used   Substance and Sexual Activity    Alcohol use: Never    Drug use: Never    Sexual activity: Defer         Home Medications:  Accu-Chek Softclix Lancets, Albuterol, EPINEPHrine, FLUoxetine, Insulin Glargine-yfgn, Mepolizumab, arformoterol, atorvastatin, budesonide, cetirizine, donepezil, fish oil, furosemide, hydroCHLOROthiazide, ipratropium-albuterol, ketotifen, metFORMIN, potassium chloride, revefenacin, simvastatin, tiotropium bromide-olodaterol, traZODone, valsartan, and valsartan-hydrochlorothiazide    Allergies:  No Known Allergies    Review of Systems   All systems were reviewed and negative except for: shortness of breath, edema lower extremities     Objective   Objective     Vitals:   Temp:  [98 øF (36.7 øC)] 98 øF (36.7 øC)  Heart Rate:  [126-127] 126  Resp:  [17-18] 17  BP: (115-128)/(81-98) 124/91    Physical Exam    Constitutional: Awake, alert, no acute distress   Eyes: Pupils equal, sclerae anicteric, no conjunctival injection   HENT: NCAT, mucous membranes moist   Neck: Supple, no thyromegaly, no lymphadenopathy, trachea midline   Respiratory: Decreased breath sounds   Cardiovascular: RRR, no murmurs, rubs, or gallops, palpable pedal pulses bilaterally   Gastrointestinal: Positive bowel sounds, soft, nontender, nondistended   Musculoskeletal: Edema present   Psychiatric: Appropriate affect, cooperative   Neurologic: Oriented x 3, strength symmetric in all extremities, Cranial Nerves grossly intact to  confrontation, speech clear   Skin: No rashes     Result Review    Result Review:  I have personally reviewed the results from the time of this admission to 8/3/2023 18:14 EDT and agree with these findings:  [x]  Laboratory  []  Microbiology  [x]  Radiology  []  EKG/Telemetry   [x]  Cardiology/Vascular   []  Pathology  [x]  Old records  []  Other:      Assessment & Plan   Assessment / Plan   #1 A-fib/aflutter  -Keep magnesium above 2, K above 4. Will give 2 mg of magnesium.    -Started on Cardizem drip; started on Lovenox.  Will transition to DOAC based on clinical course.  Patient has a high [id number] score.  Left atrial dilation on prior echo.  -Check TSH, echo  -Cardiology consulted    #2 decompensated systolic CHF  -Echo shows an EF of 47%.  Stress test shows 38%  -Goal-directed care: Patient on Lasix, valsartan, should be on Coreg?  However patient is a poor historian.  Will need to probably add Jardiance in the future.  -Given 40 of IV Lasix once in the ED.  We will need to do spot diuresis because blood pressure is running on the lower side.    #3 COPD exacerbation   -Continue Brovana, DuoNeb, Pulmicort, steriods  -RT consulted for bronchopulmonary hygiene, bronchodilator tailoring.    #4 diabetes  -Hold metformin; insulin sliding scale    #5 LUZMARIA possibly secondary to hypotension, decompensated CHF.  Holding losartan because of hypotension and LUZMARIA and HCTZ.    #6 lower extremity edema bilaterally we will check ultrasound to rule out DVT as well.    #7 dementia holding Aricept because arrhythmia and prolonged Qtc.  Will give him 2 of magnesium.     #8 choking on food  -swallow eval ordered.      DVT prophylaxis:  Medical and mechanical DVT prophylaxis orders are present.    CODE STATUS:    Level Of Support Discussed With: Patient  Code Status (Patient has no pulse and is not breathing): CPR (Attempt to Resuscitate)  Medical Interventions (Patient has pulse or is breathing): Full Support  Release to patient:  Routine Release      Admission Status:  I believe this patient meets inpatient  status.    Electronically signed by Milo Resendiz DO, 08/03/23, 6:14 PM EDT.

## 2023-08-03 NOTE — Clinical Note
A 7 fr sheath was  inserted using micropuncture technique with ultrasound guidance into the right internal jugular vein. Sheath insertion not delayed.

## 2023-08-03 NOTE — Clinical Note
Prepped: right neck. Prepped with: ChloraPrep. The site was clipped. The patient was draped in a sterile fashion.

## 2023-08-03 NOTE — Clinical Note
Due to results of right heart cath, going to do left heart cath and possibly place impella, patient prepped for additional procedures

## 2023-08-04 ENCOUNTER — APPOINTMENT (OUTPATIENT)
Dept: CARDIOLOGY | Facility: HOSPITAL | Age: 78
DRG: 215 | End: 2023-08-04
Payer: OTHER GOVERNMENT

## 2023-08-04 PROBLEM — E87.70 VOLUME OVERLOAD: Status: ACTIVE | Noted: 2023-08-04

## 2023-08-04 LAB
ANION GAP SERPL CALCULATED.3IONS-SCNC: 11.9 MMOL/L (ref 5–15)
ASCENDING AORTA: 3.5 CM
BASOPHILS # BLD AUTO: 0.01 10*3/MM3 (ref 0–0.2)
BASOPHILS NFR BLD AUTO: 0.1 % (ref 0–1.5)
BH CV ECHO MEAS - AO MAX PG: 3 MMHG
BH CV ECHO MEAS - AO MEAN PG: 2 MMHG
BH CV ECHO MEAS - AO ROOT DIAM: 3.3 CM
BH CV ECHO MEAS - AO V2 MAX: 85 CM/SEC
BH CV ECHO MEAS - AO V2 VTI: 12.5 CM
BH CV ECHO MEAS - AVA(I,D): 1.76 CM2
BH CV ECHO MEAS - EDV(CUBED): 65.5 ML
BH CV ECHO MEAS - EDV(MOD-SP2): 120 ML
BH CV ECHO MEAS - EDV(MOD-SP4): 89.2 ML
BH CV ECHO MEAS - EF(MOD-BP): 21.2 %
BH CV ECHO MEAS - EF(MOD-SP2): 20.5 %
BH CV ECHO MEAS - EF(MOD-SP4): 21.3 %
BH CV ECHO MEAS - ESV(CUBED): 53.2 ML
BH CV ECHO MEAS - ESV(MOD-SP2): 95.4 ML
BH CV ECHO MEAS - ESV(MOD-SP4): 70.2 ML
BH CV ECHO MEAS - FS: 6.7 %
BH CV ECHO MEAS - IVS/LVPW: 0.85 CM
BH CV ECHO MEAS - IVSD: 1.53 CM
BH CV ECHO MEAS - LA DIMENSION: 4.1 CM
BH CV ECHO MEAS - LV DIASTOLIC VOL/BSA (35-75): 39.7 CM2
BH CV ECHO MEAS - LV MASS(C)D: 277.9 GRAMS
BH CV ECHO MEAS - LV MAX PG: 1.21 MMHG
BH CV ECHO MEAS - LV MEAN PG: 1 MMHG
BH CV ECHO MEAS - LV SYSTOLIC VOL/BSA (12-30): 31.2 CM2
BH CV ECHO MEAS - LV V1 MAX: 55 CM/SEC
BH CV ECHO MEAS - LV V1 VTI: 7 CM
BH CV ECHO MEAS - LVIDD: 4 CM
BH CV ECHO MEAS - LVIDS: 3.8 CM
BH CV ECHO MEAS - LVOT AREA: 3.1 CM2
BH CV ECHO MEAS - LVOT DIAM: 2 CM
BH CV ECHO MEAS - LVPWD: 1.8 CM
BH CV ECHO MEAS - MV MAX PG: 5 MMHG
BH CV ECHO MEAS - MV MEAN PG: 2 MMHG
BH CV ECHO MEAS - MV V2 VTI: 17.9 CM
BH CV ECHO MEAS - MVA(VTI): 1.23 CM2
BH CV ECHO MEAS - RAP SYSTOLE: 3 MMHG
BH CV ECHO MEAS - RVDD: 2.9 CM
BH CV ECHO MEAS - RVSP: 44.2 MMHG
BH CV ECHO MEAS - SI(MOD-SP2): 10.9 ML/M2
BH CV ECHO MEAS - SI(MOD-SP4): 8.5 ML/M2
BH CV ECHO MEAS - SV(LVOT): 22 ML
BH CV ECHO MEAS - SV(MOD-SP2): 24.6 ML
BH CV ECHO MEAS - SV(MOD-SP4): 19 ML
BH CV ECHO MEAS - TR MAX PG: 41.2 MMHG
BH CV ECHO MEAS - TR MAX VEL: 321 CM/SEC
BH CV LOWER VASCULAR LEFT COMMON FEMORAL AUGMENT: NORMAL
BH CV LOWER VASCULAR LEFT COMMON FEMORAL COMPETENT: NORMAL
BH CV LOWER VASCULAR LEFT COMMON FEMORAL COMPRESS: NORMAL
BH CV LOWER VASCULAR LEFT COMMON FEMORAL PHASIC: NORMAL
BH CV LOWER VASCULAR LEFT COMMON FEMORAL SPONT: NORMAL
BH CV LOWER VASCULAR LEFT DISTAL FEMORAL COMPRESS: NORMAL
BH CV LOWER VASCULAR LEFT GASTRONEMIUS COMPRESS: NORMAL
BH CV LOWER VASCULAR LEFT GREATER SAPH AK COMPRESS: NORMAL
BH CV LOWER VASCULAR LEFT GREATER SAPH BK COMPRESS: NORMAL
BH CV LOWER VASCULAR LEFT LESSER SAPH COMPRESS: NORMAL
BH CV LOWER VASCULAR LEFT MID FEMORAL AUGMENT: NORMAL
BH CV LOWER VASCULAR LEFT MID FEMORAL COMPETENT: NORMAL
BH CV LOWER VASCULAR LEFT MID FEMORAL COMPRESS: NORMAL
BH CV LOWER VASCULAR LEFT MID FEMORAL PHASIC: NORMAL
BH CV LOWER VASCULAR LEFT MID FEMORAL SPONT: NORMAL
BH CV LOWER VASCULAR LEFT PERONEAL COMPRESS: NORMAL
BH CV LOWER VASCULAR LEFT POPLITEAL AUGMENT: NORMAL
BH CV LOWER VASCULAR LEFT POPLITEAL COMPETENT: NORMAL
BH CV LOWER VASCULAR LEFT POPLITEAL COMPRESS: NORMAL
BH CV LOWER VASCULAR LEFT POPLITEAL PHASIC: NORMAL
BH CV LOWER VASCULAR LEFT POPLITEAL SPONT: NORMAL
BH CV LOWER VASCULAR LEFT POSTERIOR TIBIAL COMPRESS: NORMAL
BH CV LOWER VASCULAR LEFT PROXIMAL FEMORAL COMPRESS: NORMAL
BH CV LOWER VASCULAR LEFT SAPHENOFEMORAL JUNCTION COMPRESS: NORMAL
BH CV LOWER VASCULAR RIGHT COMMON FEMORAL AUGMENT: NORMAL
BH CV LOWER VASCULAR RIGHT COMMON FEMORAL COMPETENT: NORMAL
BH CV LOWER VASCULAR RIGHT COMMON FEMORAL COMPRESS: NORMAL
BH CV LOWER VASCULAR RIGHT COMMON FEMORAL PHASIC: NORMAL
BH CV LOWER VASCULAR RIGHT COMMON FEMORAL SPONT: NORMAL
BH CV LOWER VASCULAR RIGHT DISTAL FEMORAL COMPRESS: NORMAL
BH CV LOWER VASCULAR RIGHT GASTRONEMIUS COMPRESS: NORMAL
BH CV LOWER VASCULAR RIGHT GREATER SAPH AK COMPRESS: NORMAL
BH CV LOWER VASCULAR RIGHT GREATER SAPH BK COMPRESS: NORMAL
BH CV LOWER VASCULAR RIGHT LESSER SAPH COMPRESS: NORMAL
BH CV LOWER VASCULAR RIGHT MID FEMORAL AUGMENT: NORMAL
BH CV LOWER VASCULAR RIGHT MID FEMORAL COMPETENT: NORMAL
BH CV LOWER VASCULAR RIGHT MID FEMORAL COMPRESS: NORMAL
BH CV LOWER VASCULAR RIGHT MID FEMORAL PHASIC: NORMAL
BH CV LOWER VASCULAR RIGHT MID FEMORAL SPONT: NORMAL
BH CV LOWER VASCULAR RIGHT PERONEAL COMPRESS: NORMAL
BH CV LOWER VASCULAR RIGHT POPLITEAL AUGMENT: NORMAL
BH CV LOWER VASCULAR RIGHT POPLITEAL COMPETENT: NORMAL
BH CV LOWER VASCULAR RIGHT POPLITEAL COMPRESS: NORMAL
BH CV LOWER VASCULAR RIGHT POPLITEAL PHASIC: NORMAL
BH CV LOWER VASCULAR RIGHT POPLITEAL SPONT: NORMAL
BH CV LOWER VASCULAR RIGHT POSTERIOR TIBIAL COMPRESS: NORMAL
BH CV LOWER VASCULAR RIGHT PROXIMAL FEMORAL COMPRESS: NORMAL
BH CV LOWER VASCULAR RIGHT SAPHENOFEMORAL JUNCTION COMPRESS: NORMAL
BH CV VAS PRELIMINARY FINDINGS SCRIPTING: 1
BUN SERPL-MCNC: 28 MG/DL (ref 8–23)
BUN/CREAT SERPL: 19.4 (ref 7–25)
CALCIUM SPEC-SCNC: 9.3 MG/DL (ref 8.6–10.5)
CHLORIDE SERPL-SCNC: 104 MMOL/L (ref 98–107)
CO2 SERPL-SCNC: 22.1 MMOL/L (ref 22–29)
CREAT SERPL-MCNC: 1.44 MG/DL (ref 0.76–1.27)
DEPRECATED RDW RBC AUTO: 52.4 FL (ref 37–54)
EGFRCR SERPLBLD CKD-EPI 2021: 50.7 ML/MIN/1.73
EOSINOPHIL # BLD AUTO: 0 10*3/MM3 (ref 0–0.4)
EOSINOPHIL NFR BLD AUTO: 0 % (ref 0.3–6.2)
ERYTHROCYTE [DISTWIDTH] IN BLOOD BY AUTOMATED COUNT: 17.2 % (ref 12.3–15.4)
GLUCOSE BLDC GLUCOMTR-MCNC: 125 MG/DL (ref 70–99)
GLUCOSE BLDC GLUCOMTR-MCNC: 249 MG/DL (ref 70–99)
GLUCOSE BLDC GLUCOMTR-MCNC: 262 MG/DL (ref 70–99)
GLUCOSE BLDC GLUCOMTR-MCNC: 305 MG/DL (ref 70–99)
GLUCOSE SERPL-MCNC: 131 MG/DL (ref 65–99)
HCT VFR BLD AUTO: 40.7 % (ref 37.5–51)
HGB BLD-MCNC: 13.4 G/DL (ref 13–17.7)
IMM GRANULOCYTES # BLD AUTO: 0.03 10*3/MM3 (ref 0–0.05)
IMM GRANULOCYTES NFR BLD AUTO: 0.4 % (ref 0–0.5)
IVRT: 111 MSEC
LEFT ATRIUM VOLUME INDEX: 36.4 ML/M2
LYMPHOCYTES # BLD AUTO: 0.76 10*3/MM3 (ref 0.7–3.1)
LYMPHOCYTES NFR BLD AUTO: 10.4 % (ref 19.6–45.3)
MAGNESIUM SERPL-MCNC: 2.3 MG/DL (ref 1.6–2.4)
MCH RBC QN AUTO: 28.5 PG (ref 26.6–33)
MCHC RBC AUTO-ENTMCNC: 32.9 G/DL (ref 31.5–35.7)
MCV RBC AUTO: 86.6 FL (ref 79–97)
MONOCYTES # BLD AUTO: 0.17 10*3/MM3 (ref 0.1–0.9)
MONOCYTES NFR BLD AUTO: 2.3 % (ref 5–12)
NEUTROPHILS NFR BLD AUTO: 6.31 10*3/MM3 (ref 1.7–7)
NEUTROPHILS NFR BLD AUTO: 86.8 % (ref 42.7–76)
NRBC BLD AUTO-RTO: 0 /100 WBC (ref 0–0.2)
PHOSPHATE SERPL-MCNC: 4.1 MG/DL (ref 2.5–4.5)
PLATELET # BLD AUTO: 226 10*3/MM3 (ref 140–450)
PMV BLD AUTO: 9.4 FL (ref 6–12)
POTASSIUM SERPL-SCNC: 3.8 MMOL/L (ref 3.5–5.2)
QT INTERVAL: 376 MS
QT INTERVAL: 394 MS
RBC # BLD AUTO: 4.7 10*6/MM3 (ref 4.14–5.8)
SODIUM SERPL-SCNC: 138 MMOL/L (ref 136–145)
WBC NRBC COR # BLD: 7.28 10*3/MM3 (ref 3.4–10.8)

## 2023-08-04 PROCEDURE — 92610 EVALUATE SWALLOWING FUNCTION: CPT

## 2023-08-04 PROCEDURE — 25010000002 FUROSEMIDE PER 20 MG: Performed by: INTERNAL MEDICINE

## 2023-08-04 PROCEDURE — 82948 REAGENT STRIP/BLOOD GLUCOSE: CPT

## 2023-08-04 PROCEDURE — 80048 BASIC METABOLIC PNL TOTAL CA: CPT | Performed by: HOSPITALIST

## 2023-08-04 PROCEDURE — 36415 COLL VENOUS BLD VENIPUNCTURE: CPT | Performed by: HOSPITALIST

## 2023-08-04 PROCEDURE — 85025 COMPLETE CBC W/AUTO DIFF WBC: CPT | Performed by: HOSPITALIST

## 2023-08-04 PROCEDURE — 94799 UNLISTED PULMONARY SVC/PX: CPT

## 2023-08-04 PROCEDURE — 93005 ELECTROCARDIOGRAM TRACING: CPT | Performed by: INTERNAL MEDICINE

## 2023-08-04 PROCEDURE — 99254 IP/OBS CNSLTJ NEW/EST MOD 60: CPT | Performed by: INTERNAL MEDICINE

## 2023-08-04 PROCEDURE — 84100 ASSAY OF PHOSPHORUS: CPT | Performed by: HOSPITALIST

## 2023-08-04 PROCEDURE — 97161 PT EVAL LOW COMPLEX 20 MIN: CPT

## 2023-08-04 PROCEDURE — 93306 TTE W/DOPPLER COMPLETE: CPT | Performed by: INTERNAL MEDICINE

## 2023-08-04 PROCEDURE — 25010000002 ENOXAPARIN PER 10 MG: Performed by: HOSPITALIST

## 2023-08-04 PROCEDURE — 93970 EXTREMITY STUDY: CPT | Performed by: SURGERY

## 2023-08-04 PROCEDURE — 63710000001 INSULIN DETEMIR PER 5 UNITS: Performed by: INTERNAL MEDICINE

## 2023-08-04 PROCEDURE — 93306 TTE W/DOPPLER COMPLETE: CPT

## 2023-08-04 PROCEDURE — 97165 OT EVAL LOW COMPLEX 30 MIN: CPT

## 2023-08-04 PROCEDURE — 93010 ELECTROCARDIOGRAM REPORT: CPT | Performed by: SPECIALIST

## 2023-08-04 PROCEDURE — 25010000002 MAGNESIUM SULFATE 2 GM/50ML SOLUTION: Performed by: HOSPITALIST

## 2023-08-04 PROCEDURE — 83735 ASSAY OF MAGNESIUM: CPT | Performed by: HOSPITALIST

## 2023-08-04 PROCEDURE — 63710000001 INSULIN LISPRO (HUMAN) PER 5 UNITS: Performed by: HOSPITALIST

## 2023-08-04 PROCEDURE — 25010000002 METHYLPREDNISOLONE PER 40 MG: Performed by: HOSPITALIST

## 2023-08-04 PROCEDURE — 25010000002 SULFUR HEXAFLUORIDE MICROSPH 60.7-25 MG RECONSTITUTED SUSPENSION: Performed by: INTERNAL MEDICINE

## 2023-08-04 PROCEDURE — 93970 EXTREMITY STUDY: CPT

## 2023-08-04 PROCEDURE — 94664 DEMO&/EVAL PT USE INHALER: CPT

## 2023-08-04 RX ORDER — POTASSIUM CHLORIDE 750 MG/1
20 CAPSULE, EXTENDED RELEASE ORAL
Status: DISCONTINUED | OUTPATIENT
Start: 2023-08-04 | End: 2023-08-04

## 2023-08-04 RX ORDER — HYDROXYZINE HYDROCHLORIDE 25 MG/1
25 TABLET, FILM COATED ORAL 3 TIMES DAILY PRN
Status: DISCONTINUED | OUTPATIENT
Start: 2023-08-04 | End: 2023-08-06 | Stop reason: HOSPADM

## 2023-08-04 RX ORDER — ACETAMINOPHEN 325 MG/1
650 TABLET ORAL EVERY 6 HOURS PRN
Status: DISCONTINUED | OUTPATIENT
Start: 2023-08-04 | End: 2023-08-06 | Stop reason: HOSPADM

## 2023-08-04 RX ORDER — NICOTINE 21 MG/24HR
1 PATCH, TRANSDERMAL 24 HOURS TRANSDERMAL DAILY PRN
Status: DISCONTINUED | OUTPATIENT
Start: 2023-08-04 | End: 2023-08-06 | Stop reason: HOSPADM

## 2023-08-04 RX ORDER — LIDOCAINE 50 MG/G
1 PATCH TOPICAL DAILY PRN
Status: DISCONTINUED | OUTPATIENT
Start: 2023-08-04 | End: 2023-08-06 | Stop reason: HOSPADM

## 2023-08-04 RX ORDER — POTASSIUM CHLORIDE 750 MG/1
20 CAPSULE, EXTENDED RELEASE ORAL
Status: COMPLETED | OUTPATIENT
Start: 2023-08-04 | End: 2023-08-04

## 2023-08-04 RX ORDER — METOPROLOL SUCCINATE 25 MG/1
12.5 TABLET, EXTENDED RELEASE ORAL ONCE
Status: COMPLETED | OUTPATIENT
Start: 2023-08-04 | End: 2023-08-04

## 2023-08-04 RX ORDER — CHOLECALCIFEROL (VITAMIN D3) 125 MCG
5 CAPSULE ORAL NIGHTLY PRN
Status: DISCONTINUED | OUTPATIENT
Start: 2023-08-04 | End: 2023-08-06 | Stop reason: HOSPADM

## 2023-08-04 RX ORDER — FUROSEMIDE 10 MG/ML
60 INJECTION INTRAMUSCULAR; INTRAVENOUS
Status: DISCONTINUED | OUTPATIENT
Start: 2023-08-04 | End: 2023-08-05

## 2023-08-04 RX ORDER — ONDANSETRON 2 MG/ML
4 INJECTION INTRAMUSCULAR; INTRAVENOUS EVERY 6 HOURS PRN
Status: DISCONTINUED | OUTPATIENT
Start: 2023-08-04 | End: 2023-08-06 | Stop reason: HOSPADM

## 2023-08-04 RX ORDER — METOPROLOL SUCCINATE 25 MG/1
25 TABLET, EXTENDED RELEASE ORAL EVERY 12 HOURS SCHEDULED
Status: DISCONTINUED | OUTPATIENT
Start: 2023-08-04 | End: 2023-08-06 | Stop reason: HOSPADM

## 2023-08-04 RX ORDER — POLYETHYLENE GLYCOL 3350 17 G/17G
17 POWDER, FOR SOLUTION ORAL 2 TIMES DAILY PRN
Status: DISCONTINUED | OUTPATIENT
Start: 2023-08-04 | End: 2023-08-06 | Stop reason: HOSPADM

## 2023-08-04 RX ORDER — ALUMINA, MAGNESIA, AND SIMETHICONE 2400; 2400; 240 MG/30ML; MG/30ML; MG/30ML
15 SUSPENSION ORAL EVERY 6 HOURS PRN
Status: DISCONTINUED | OUTPATIENT
Start: 2023-08-04 | End: 2023-08-06 | Stop reason: HOSPADM

## 2023-08-04 RX ADMIN — INSULIN LISPRO 12 UNITS: 100 INJECTION, SOLUTION INTRAVENOUS; SUBCUTANEOUS at 22:10

## 2023-08-04 RX ADMIN — INSULIN LISPRO 8 UNITS: 100 INJECTION, SOLUTION INTRAVENOUS; SUBCUTANEOUS at 12:54

## 2023-08-04 RX ADMIN — FUROSEMIDE 60 MG: 10 INJECTION, SOLUTION INTRAMUSCULAR; INTRAVENOUS at 17:17

## 2023-08-04 RX ADMIN — ATORVASTATIN CALCIUM 40 MG: 40 TABLET, FILM COATED ORAL at 22:09

## 2023-08-04 RX ADMIN — KETOTIFEN FUMARATE 1 DROP: 0.25 SOLUTION/ DROPS OPHTHALMIC at 12:55

## 2023-08-04 RX ADMIN — METOPROLOL SUCCINATE 12.5 MG: 25 TABLET, EXTENDED RELEASE ORAL at 22:45

## 2023-08-04 RX ADMIN — APIXABAN 5 MG: 5 TABLET, FILM COATED ORAL at 22:09

## 2023-08-04 RX ADMIN — POTASSIUM CHLORIDE 20 MEQ: 10 CAPSULE, COATED, EXTENDED RELEASE ORAL at 17:16

## 2023-08-04 RX ADMIN — SULFUR HEXAFLUORIDE 4 ML: KIT at 12:56

## 2023-08-04 RX ADMIN — METOPROLOL SUCCINATE 25 MG: 25 TABLET, EXTENDED RELEASE ORAL at 09:21

## 2023-08-04 RX ADMIN — POTASSIUM CHLORIDE 20 MEQ: 10 CAPSULE, COATED, EXTENDED RELEASE ORAL at 12:54

## 2023-08-04 RX ADMIN — BUDESONIDE 0.5 MG: 0.5 SUSPENSION RESPIRATORY (INHALATION) at 07:46

## 2023-08-04 RX ADMIN — ARFORMOTEROL TARTRATE 15 MCG: 15 SOLUTION RESPIRATORY (INHALATION) at 19:47

## 2023-08-04 RX ADMIN — FUROSEMIDE 60 MG: 10 INJECTION, SOLUTION INTRAMUSCULAR; INTRAVENOUS at 09:21

## 2023-08-04 RX ADMIN — CETIRIZINE HYDROCHLORIDE 10 MG: 10 TABLET, FILM COATED ORAL at 09:21

## 2023-08-04 RX ADMIN — INSULIN DETEMIR 15 UNITS: 100 INJECTION, SOLUTION SUBCUTANEOUS at 17:17

## 2023-08-04 RX ADMIN — Medication 10 ML: at 09:21

## 2023-08-04 RX ADMIN — METHYLPREDNISOLONE SODIUM SUCCINATE 40 MG: 40 INJECTION INTRAMUSCULAR; INTRAVENOUS at 07:16

## 2023-08-04 RX ADMIN — MAGNESIUM SULFATE HEPTAHYDRATE 2 G: 40 INJECTION, SOLUTION INTRAVENOUS at 00:19

## 2023-08-04 RX ADMIN — ENOXAPARIN SODIUM 120 MG: 150 INJECTION SUBCUTANEOUS at 07:25

## 2023-08-04 RX ADMIN — INSULIN LISPRO 16 UNITS: 100 INJECTION, SOLUTION INTRAVENOUS; SUBCUTANEOUS at 17:16

## 2023-08-04 RX ADMIN — APIXABAN 5 MG: 5 TABLET, FILM COATED ORAL at 09:21

## 2023-08-04 RX ADMIN — Medication 10 ML: at 22:14

## 2023-08-04 RX ADMIN — ALUMINUM HYDROXIDE, MAGNESIUM HYDROXIDE, AND DIMETHICONE 15 ML: 400; 400; 40 SUSPENSION ORAL at 19:23

## 2023-08-04 RX ADMIN — ARFORMOTEROL TARTRATE 15 MCG: 15 SOLUTION RESPIRATORY (INHALATION) at 07:46

## 2023-08-04 RX ADMIN — KETOTIFEN FUMARATE 1 DROP: 0.25 SOLUTION/ DROPS OPHTHALMIC at 22:10

## 2023-08-04 NOTE — PLAN OF CARE
Goal Outcome Evaluation:  Plan of Care Reviewed With: patient, spouse        Progress: no change  Outcome Evaluation: PT NEW ADMIT FROM ED, ON CARDIZEM GTT 2.5 MG /HR AS BP DROPS IF RATE IS INCRESED, HR LOW 120s. NO NEW COMPLAINTS. SHIFT UNEVENTFUL.Jodi Adams RN

## 2023-08-04 NOTE — PROGRESS NOTES
[facility]   Hospitalist Progress Note    Date of admission: 8/3/2023  Patient Name: Andrew Hart  1945  Date: 8/4/2023      Subjective     Chief Complaint   Patient presents with    Shortness of Breath    Edema       Summary: 77-year-old male with 2 weeks of worsening shortness of air lower extremity edema presents with a flutter with RVR, stomach CHF exacerbation.  Cardiology consulted/assisting    Interval Followup: Less short of air today.  No smoking history.  Has some wheezing.  Denies fevers chills.  Has outpatient      Objective     Vitals:   Temp:  [97.4 øF (36.3 øC)-97.7 øF (36.5 øC)] 97.6 øF (36.4 øC)  Heart Rate:  [] 123  Resp:  [11-18] 18  BP: (102-124)/(75-99) 114/89    Physical Exam  Awake, conversant patient answering appropriately  Mild upper airway end expiratory wheezing, faint crackles in lower lung fields  Elevated rate irregular regular rhythm bilateral lower extremity 2+ edema  Abdomen soft nontender nondistended      Result Review:  Vital signs, labs and recent relevant imaging reviewed.        acetaminophen    aluminum-magnesium hydroxide-simethicone    senna-docusate sodium **AND** [DISCONTINUED] polyethylene glycol **AND** bisacodyl **AND** bisacodyl    dextrose    dextrose    Diclofenac Sodium    glucagon (human recombinant)    hydrOXYzine    ipratropium-albuterol    lidocaine    melatonin    nicotine    ondansetron    polyethylene glycol    sodium chloride    sodium chloride    sodium chloride    apixaban, 5 mg, Oral, Q12H  arformoterol, 15 mcg, Nebulization, BID - RT  atorvastatin, 40 mg, Oral, Nightly  budesonide, 0.5 mg, Nebulization, Daily - RT  cetirizine, 10 mg, Oral, Daily  furosemide, 60 mg, Intravenous, BID  insulin lispro, 4-24 Units, Subcutaneous, 4x Daily AC & at Bedtime  ketotifen, 1 drop, Both Eyes, BID  methylPREDNISolone sodium succinate, 40 mg, Intravenous, Q12H  metoprolol succinate XL, 25 mg, Oral, Q12H  potassium chloride, 20 mEq, Oral, TID With  Meals  senna-docusate sodium, 2 tablet, Oral, BID  sodium chloride, 10 mL, Intravenous, Q12H        Assessment / Plan     Assessment/Plan:  New onset atrial flutter with RVR  New onset systolic CHF exacerbation  COPD with exacerbation  NSTEMI type II secondary to volume overload and RVR  LUZMARIA suspect cardiorenal, previous creatinine on 3/6/2023 at 4.2  History of dementia  IDDM2 with hyperglycemia in setting of steroids  Prolonged QTC    Continue IV diuresis, replace potassium, monitor renal function lites closely.  Check PVR  Blood pressure limiting significant titration of medications, try to add low-dose beta-blocker for rate control as tolerated with metoprolol succinate.  Holding home losartan, HCTZ.  Additional goal-directed medical therapy as able  Currently off diltiazem drip  Cardiology consult, appreciate assistance, possibly ERICK cardioversion once volume status optimized further  Continue on current inhalers, respiratory hygiene, steroids given today, defer additional given hyperglycemia and fluid overload unless has worsening respiratory status  Continue insulin, titrate monitor blood glucose  Repeat EKG, check qtc, if improving consider resuming donepezil at that time  Speech therapy evaluation given questionable choking  PT OT, may benefit from placement  Check a.m. CBC, BMP, magnesium, phosphorus  Continue hospital monitoring and treatment at current level of care - does not need upgraded at this time.  Discuss with cardiology    Cont close inpatient monitoring       DVT prophylaxis:  Medical and mechanical DVT prophylaxis orders are present.    Level Of Support Discussed With: Patient  Code Status (Patient has no pulse and is not breathing): CPR (Attempt to Resuscitate)  Medical Interventions (Patient has pulse or is breathing): Full Support  Release to patient: Routine Release        CBC          3/6/2023    17:14 8/3/2023    15:19 8/4/2023    04:21   CBC   WBC 10.00  7.92  7.28    RBC 4.70  5.06   4.70    Hemoglobin 13.3  14.5  13.4    Hematocrit 40.3  43.8  40.7    MCV 85.7  86.6  86.6    MCH 28.3  28.7  28.5    MCHC 33.0  33.1  32.9    RDW 13.8  17.3  17.2    Platelets 381  243  226        CMP          3/6/2023    17:14 8/3/2023    15:19 8/4/2023    04:21   CMP   Glucose 91  120  131    BUN 14  28  28    Creatinine 1.24  1.65  1.44    EGFR 60.6  43.0  50.7    Sodium 141  140  138    Potassium 4.2  4.1  3.8    Chloride 105  102  104    Calcium 9.7  9.7  9.3    Total Protein 7.4  7.6     Albumin 3.8  4.3     Globulin 3.6  3.3     Total Bilirubin 0.2  0.7     Alkaline Phosphatase 84  123     AST (SGOT) 16  78     ALT (SGPT) 14  158     Albumin/Globulin Ratio 1.1  1.3     BUN/Creatinine Ratio 11.3  17.0  19.4    Anion Gap 8.0  12.5  11.9

## 2023-08-04 NOTE — THERAPY EVALUATION
Patient Name: Andrew Hart  : 1945    MRN: [id number]                              Today's Date: 2023       Admit Date: 8/3/2023    Visit Dx:     ICD-10-CM ICD-9-CM   1. Acute congestive heart failure, unspecified heart failure type  I50.9 428.0   2. Atrial flutter, unspecified type  I48.92 427.32   3. Decreased activities of daily living (ADL)  Z78.9 V49.89     Patient Active Problem List   Diagnosis    Severe persistent asthma with acute exacerbation    SOB (shortness of breath)    Chronic cough    Atrial flutter    Volume overload     Past Medical History:   Diagnosis Date    Asthma, intrinsic     PTSD (post-traumatic stress disorder)     Renal stone      Past Surgical History:   Procedure Laterality Date    APPENDECTOMY      ORTHOPEDIC SURGERY Right     ARM    TRIGGER FINGER RELEASE Left       General Information       Row Name 23 1412          OT Time and Intention    Document Type evaluation  -     Mode of Treatment individual therapy;occupational therapy  -       Row Name 23 141          General Information    Patient Profile Reviewed yes  -     Prior Level of Function --  Pt reports requiring min assist with ADLs from his wife, ambulating with a STC, has a step over tub with shower chair/grab bars, elevated commode, and stands to groom.  -     Existing Precautions/Restrictions fall  -     Barriers to Rehab none identified  -       Row Name 23 1412          Occupational Profile    Reason for Services/Referral (Occupational Profile) Patient is a 77 year old male admitted to [facility] for exertional dyspnea and edema on 2023. Occupational therapy consulted due to recent decline in ADLs/functional transfers. No previous occupational therapy services for current condition.  -       Row Name 23 1412          Living Environment    People in Home spouse  -       Row Name 23 1412          Home Main Entrance    Number of Stairs, Main  Entrance none  -       Row Name 08/04/23 1412          Stairs Within Home, Primary    Number of Stairs, Within Home, Primary none  -       Row Name 08/04/23 1412          Cognition    Orientation Status (Cognition) oriented x 4  -       Row Name 08/04/23 1412          Safety Issues, Functional Mobility    Impairments Affecting Function (Mobility) balance;endurance/activity tolerance  -               User Key  (r) = Recorded By, (t) = Taken By, (c) = Cosigned By      Initials Name Provider Type     Jennifer Murphy OT Occupational Therapist                     Mobility/ADL's       Row Name 08/04/23 1414          Bed Mobility    Comment, (Bed Mobility) Patient upright and seated in recliner upon therapist arrival.  -       Row Name 08/04/23 1414          Transfers    Transfers sit-stand transfer;stand-sit transfer  -       Row Name 08/04/23 1414          Sit-Stand Transfer    Sit-Stand [place] (Transfers) standby assist  -     Assistive Device (Sit-Stand Transfers) walker, front-wheeled  -       Row Name 08/04/23 1414          Stand-Sit Transfer    Stand-Sit [place] (Transfers) standby assist  -     Assistive Device (Stand-Sit Transfers) walker, front-wheeled  -       Row Name 08/04/23 1414          Activities of Daily Living    BADL Assessment/Intervention bathing;upper body dressing;lower body dressing;grooming;feeding;toileting  -       Row Name 08/04/23 1414          Bathing Assessment/Intervention    [place] Level (Bathing) bathing skills;upper body;set up;lower body;maximum assist (25% patient effort)  -       Row Name 08/04/23 1414          Upper Body Dressing Assessment/Training    [place] Level (Upper Body Dressing) upper body dressing skills;set up  -       Row Name 08/04/23 1414          Lower Body Dressing Assessment/Training    [place] Level (Lower Body Dressing) lower body dressing skills;maximum assist (25% patient effort)  -       Row Name  08/04/23 1414          Grooming Assessment/Training    [place] Level (Grooming) grooming skills;set up  -       Row Name 08/04/23 1414          Self-Feeding Assessment/Training    [place] Level (Feeding) feeding skills;set up  -[facility] Name 08/04/23 1414          Toileting Assessment/Training    [place] Level (Toileting) toileting skills;minimum assist (75% patient effort)  -               User Key  (r) = Recorded By, (t) = Taken By, (c) = Cosigned By      Initials Name Provider Type     Jennifer Murphy OT Occupational Therapist                   Obj/Interventions       Row Name 08/04/23 1415          Sensory Assessment (Somatosensory)    Sensory Assessment (Somatosensory) UE sensation intact  -[facility] Name 08/04/23 1415          Vision Assessment/Intervention    Visual Impairment/Limitations WFL  -LF       Row Name 08/04/23 1415          Range of Motion Comprehensive    General Range of Motion bilateral upper extremity ROM WFL  -LF       Row Name 08/04/23 1415          Strength Comprehensive (MMT)    Comment, General Manual Muscle Testing (MMT) Assessment 5/5 BUEs  -[facility] Name 08/04/23 1415          Motor Skills    Motor Skills coordination;functional endurance  -LF     Coordination WFL  -     Functional Endurance Fair  -LF       Row Name 08/04/23 1415          Balance    Balance Assessment sitting dynamic balance;standing dynamic balance  -LF     Dynamic Sitting Balance independent  -LF     Position, Sitting Balance unsupported;sitting in chair  -LF     Dynamic Standing Balance standby assist  -LF     Position/Device Used, Standing Balance supported;walker, front-wheeled  -LF               User Key  (r) = Recorded By, (t) = Taken By, (c) = Cosigned By      Initials Name Provider Type     Jennifer Murphy OT Occupational Therapist                   Goals/Plan       Row Name 08/04/23 1417          Bed Mobility Goal 1 (OT)    Activity/Assistive Device (Bed Mobility Goal 1,  OT) bed mobility activities, all  -LF     [place] Level/Cues Needed (Bed Mobility Goal 1, OT) modified independence  -LF     Time Frame (Bed Mobility Goal 1, OT) long term goal (LTG);10 days  -LF       Row Name 08/04/23 1417          Transfer Goal 1 (OT)    Activity/Assistive Device (Transfer Goal 1, OT) transfers, all;walker, rolling  -LF     [place] Level/Cues Needed (Transfer Goal 1, OT) modified independence  -LF     Time Frame (Transfer Goal 1, OT) long term goal (LTG);10 days  -LF       Row Name 08/04/23 1417          Bathing Goal 1 (OT)    Activity/Device (Bathing Goal 1, OT) bathing skills, all  -LF     [place] Level/Cues Needed (Bathing Goal 1, OT) modified independence  -LF     Time Frame (Bathing Goal 1, OT) long term goal (LTG);10 days  -LF       Row Name 08/04/23 1417          Dressing Goal 1 (OT)    Activity/Device (Dressing Goal 1, OT) dressing skills, all  -LF     [place]/Cues Needed (Dressing Goal 1, OT) modified independence  -LF     Time Frame (Dressing Goal 1, OT) long term goal (LTG);10 days  -LF       Row Name 08/04/23 1417          Toileting Goal 1 (OT)    Activity/Device (Toileting Goal 1, OT) toileting skills, all  -LF     [place] Level/Cues Needed (Toileting Goal 1, OT) modified independence  -LF     Time Frame (Toileting Goal 1, OT) long term goal (LTG);10 days  -LF       Row Name 08/04/23 1417          Problem Specific Goal 1 (OT)    Problem Specific Goal 1 (OT) Patient will demonstrate good endurance to support ADLs/functional transfers.  -LF     Time Frame (Problem Specific Goal 1, OT) long term goal (LTG);10 days  -LF       Row Name 08/04/23 1417          Therapy Assessment/Plan (OT)    Planned Therapy Interventions (OT) activity tolerance training;patient/caregiver education/training;BADL retraining;functional balance retraining;occupation/activity based interventions;strengthening exercise;transfer/mobility retraining  -LF               User Key  (r) =  Recorded By, (t) = Taken By, (c) = Cosigned By      Initials Name Provider Type     Jennifer Murphy OT Occupational Therapist                   Clinical Impression       Row Name 08/04/23 1416          Pain Assessment    Additional Documentation Pain Scale: FACES Pre/Post-Treatment (Group)  -[facility] Name 08/04/23 1416          Pain Scale: FACES Pre/Post-Treatment    Pain: FACES Scale, Pretreatment 0-->no hurt  -LF     Posttreatment Pain Rating 0-->no hurt  -[facility] Name 08/04/23 1416          Plan of Care Review    Plan of Care Reviewed With patient;spouse  -     Progress no change  -     Outcome Evaluation Patient presents with limitations in self-care, functional transfers, balance, and endurance. He would benefit from continued skilled occupational therapy services to maximize independence with ADLs/functional transfers.  -[facility] Name 08/04/23 1416          Therapy Assessment/Plan (OT)    Patient/Family Therapy Goal Statement (OT) To maximize independence.  -     Rehab Potential (OT) good, to achieve stated therapy goals  -     Criteria for Skilled Therapeutic Interventions Met (OT) yes;meets criteria;skilled treatment is necessary  -     Therapy Frequency (OT) 5 times/wk  -[facility] Name 08/04/23 1416          Therapy Plan Review/Discharge Plan (OT)    Anticipated Discharge Disposition (OT) home with assist;home with home health  -[facility] Name 08/04/23 1416          Vital Signs    O2 Delivery Pre Treatment room air  -LF     O2 Delivery Intra Treatment room air  -LF     O2 Delivery Post Treatment room air  -               User Key  (r) = Recorded By, (t) = Taken By, (c) = Cosigned By      Initials Name Provider Type     Jennifer Murphy OT Occupational Therapist                   Outcome Measures       Row Name 08/04/23 1417          How much help from another is currently needed...    Putting on and taking off regular lower body clothing? 2  -LF     Bathing (including  washing, rinsing, and drying) 2  -LF     Toileting (which includes using toilet bed pan or urinal) 3  -LF     Putting on and taking off regular upper body clothing 4  -LF     Taking care of personal grooming (such as brushing teeth) 4  -LF     Eating meals 4  -LF     AM-PAC 6 Clicks Score (OT) 19  -LF       Row Name 08/04/23 0815          How much help from another person do you currently need...    Turning from your back to your side while in flat bed without using bedrails? 3  -AG     Moving from lying on back to sitting on the side of a flat bed without bedrails? 3  -AG     Moving to and from a bed to a chair (including a wheelchair)? 3  -AG     Standing up from a chair using your arms (e.g., wheelchair, bedside chair)? 3  -AG     Climbing 3-5 steps with a railing? 3  -AG     To walk in hospital room? 3  -AG     AM-[facility] 6 Clicks Score (PT) 18  -AG     Highest level of mobility 6 --> Walked 10 steps or more  -AG       Row Name 08/04/23 1417          Functional Assessment    Outcome Measure Options AM-PAC 6 Clicks Daily Activity (OT);Optimal Instrument  -LF       Row Name 08/04/23 1417          Optimal Instrument    Optimal Instrument Optimal - 3  -LF     Bending/Stooping 3  -LF     Standing 2  -LF     Reaching 1  -LF     From the list, choose the 3 activities you would most like to be able to do without any difficulty Bending/stooping;Standing;Reaching  -LF     Total Score Optimal - 3 6  -LF               User Key  (r) = Recorded By, (t) = Taken By, (c) = Cosigned By      Initials Name Provider Type    Mya Beavers RN Registered Nurse    Jennifer Sadler OT Occupational Therapist                    Occupational Therapy Education       Title: PT OT SLP Therapies (Done)       Topic: Occupational Therapy (Done)       Point: ADL training (Done)       Description:   Instruct learner(s) on proper safety adaptation and remediation techniques during self care or transfers.   Instruct in proper use of assistive  devices.                  Learning Progress Summary             Patient Acceptance, E,TB, VU by  at 8/4/2023 1418                         Point: Precautions (Done)       Description:   Instruct learner(s) on prescribed precautions during self-care and functional transfers.                  Learning Progress Summary             Patient Acceptance, E,TB, VU by  at 8/4/2023 1418                         Point: Body mechanics (Done)       Description:   Instruct learner(s) on proper positioning and spine alignment during self-care, functional mobility activities and/or exercises.                  Learning Progress Summary             Patient Acceptance, E,TB, VU by  at 8/4/2023 1418                                         User Key       Initials Effective Dates Name Provider Type Discipline     06/16/21 -  Jennifer Murphy OT Occupational Therapist OT                  OT Recommendation and Plan  Planned Therapy Interventions (OT): activity tolerance training, patient/caregiver education/training, BADL retraining, functional balance retraining, occupation/activity based interventions, strengthening exercise, transfer/mobility retraining  Therapy Frequency (OT): 5 times/wk  Plan of Care Review  Plan of Care Reviewed With: patient, spouse  Progress: no change  Outcome Evaluation: Patient presents with limitations in self-care, functional transfers, balance, and endurance. He would benefit from continued skilled occupational therapy services to maximize independence with ADLs/functional transfers.     Time Calculation:   Evaluation Complexity (OT)  Review Occupational Profile/Medical/Therapy History Complexity: brief/low complexity  Assessment, Occupational Performance/Identification of Deficit Complexity: 3-5 performance deficits  Clinical Decision Making Complexity (OT): problem focused assessment/low complexity  Overall Complexity of Evaluation (OT): low complexity     Time Calculation- OT       Row Name 08/04/23  1418             Time Calculation- OT    OT Received On 08/04/23  -LF      OT Goal Re-Cert Due Date 08/13/23  -LF         Untimed Charges    OT Eval/Re-eval Minutes 33  -LF         Total Minutes    Untimed Charges Total Minutes 33  -LF       Total Minutes 33  -LF                User Key  (r) = Recorded By, (t) = Taken By, (c) = Cosigned By      Initials Name Provider Type     Jennifer Murphy OT Occupational Therapist                  Therapy Charges for Today       Code Description Service Date Service Provider Modifiers Qty    [id number] HC OT EVAL LOW COMPLEXITY 3 8/4/2023 Jennifer Murphy OT GO 1                 Jennifer Murphy OT  8/4/2023

## 2023-08-04 NOTE — PLAN OF CARE
Goal Outcome Evaluation:  Plan of Care Reviewed With: patient, spouse        Progress: no change  Outcome Evaluation: Patient presents with limitations in self-care, functional transfers, balance, and endurance. He would benefit from continued skilled occupational therapy services to maximize independence with ADLs/functional transfers.      Anticipated Discharge Disposition (OT): home with assist, home with home health

## 2023-08-04 NOTE — CONSULTS
"Nutrition Services    Patient Name: Andrew Hart  YOB: 1945  MRN: [id number]  Admission date: 8/3/2023      CLINICAL NUTRITION ASSESSMENT      Reason for Assessment  Need for education     H&P:    Past Medical History:   Diagnosis Date    Asthma, intrinsic     PTSD (post-traumatic stress disorder)     Renal stone         Current Problems:   Active Hospital Problems    Diagnosis     **Atrial flutter     Volume overload     SOB (shortness of breath)         Nutrition/Diet History         Narrative     RD consulted for CHF/wt loss diet education. Pt is admitted d/t decompensated CHF.     Pt's intake is 100% at lunch today, previously NPO. RD provided CHF and wt loss diet education. Pt and wife v/u. All questions were answered.     RD will continue to monitor per protocol.        Anthropometrics        Current Height, Weight Height: 172.7 cm (68\")  Weight: 113 kg (250 lb 3.6 oz)   Current BMI Body mass index is 38.05 kg/mý.       Weight Hx  Wt Readings from Last 30 Encounters:   08/03/23 2154 113 kg (250 lb 3.6 oz)   08/03/23 1808 113 kg (250 lb 3.6 oz)   08/03/23 1423 116 kg (255 lb 4.8 oz)   07/11/23 1104 111 kg (243 lb 12.8 oz)   04/11/23 0957 109 kg (241 lb 3.2 oz)   03/13/23 0954 107 kg (236 lb 12.8 oz)   03/06/23 1435 108 kg (239 lb)            Wt Change Observation +4% x 5 months      Estimated/Assessed Needs       Energy Requirements 25-30 kcal/kg adj BW (80 kg)   EST Needs (kcal/day) 9802-9822       Protein Requirements 1.0 g/kg   EST Daily Needs (g/day) 80       Fluid Requirements 25 ml/kg    Estimated Needs (mL/day) 2000     Labs/Medications         Pertinent Labs Reviewed.   Results from last 7 days   Lab Units 08/04/23  0421 08/03/23  1519   SODIUM mmol/L 138 140   POTASSIUM mmol/L 3.8 4.1   CHLORIDE mmol/L 104 102   CO2 mmol/L 22.1 25.5   BUN mg/dL 28* 28*   CREATININE mg/dL 1.44* 1.65*   CALCIUM mg/dL 9.3 9.7   BILIRUBIN mg/dL  --  0.7   ALK PHOS U/L  --  123*   ALT (SGPT) U/L  --  158* "   AST (SGOT) U/L  --  78*   GLUCOSE mg/dL 131* 120*     Results from last 7 days   Lab Units 08/04/23  0421 08/03/23  1519   MAGNESIUM mg/dL 2.3 2.1   PHOSPHORUS mg/dL 4.1  --    HEMOGLOBIN g/dL 13.4 14.5   HEMATOCRIT % 40.7 43.8     No results found for: COVID19  Lab Results   Component Value Date    HGBA1C 7.50 (H) 08/03/2023         Pertinent Medications Reviewed.     Current Nutrition Orders & Evaluation of Intake       Oral Nutrition     Current PO Diet Diet: Cardiac Diets, Diabetic Diets; Healthy Heart (2-3 Na+); Consistent Carbohydrate; Texture: Regular Texture (IDDSI 7); Fluid Consistency: Thin (IDDSI 0)   Supplement Orders Placed This Encounter      DIET MESSAGE Regular guest tray       Malnutrition Severity Assessment                Nutrition Diagnosis         Nutrition Dx Problem 1 Food and nutrition knowledge deficit related to lack of prior nutrition related education as evidenced by  newly dx CHF       Nutrition Intervention         CHF and wt loss diet education provided     Medical Nutrition Therapy/Nutrition Education          Learner     Readiness Patient and Significant Other  Acceptance     Method     Response Explanation and Written Material  Verbalizes understanding     Monitor/Evaluation        Monitor Per protocol, PO intake, Weight, POC/GOC       Nutrition Discharge Plan         Heart healthy, low sodium, low saturated fat diet        Electronically signed by:  Jennifer Maxwell RD  08/04/23 15:17 EDT

## 2023-08-04 NOTE — PLAN OF CARE
Goal Outcome Evaluation:  Plan of Care Reviewed With: patient           Outcome Evaluation: Patient presents with minimal deficits with ambulation and balance.  He will benefit from inpatient PT services and home health services upon return home.      Anticipated Discharge Disposition (PT): home with home health

## 2023-08-04 NOTE — CONSULTS
Cardiology Consult Note  [facility] 5TH FLOOR SURGICAL TELEMETRY UNIT          Patient Identification:  Andrew Hart      [id number]  77 y.o.        male  1945       Date of Consultation: 8/4    Reason for Consultation: Atrial flutter, probably new onset congestive heart failure    PCP: Torres Acevedo MD  Primary cardiologist: None    History of Present Illness:     77-year-old -American male.  For the past week or 2 he has had increased shortness of breath and progressive lower extremity edema, associated with orthopnea and fatigue.  He has some fleeting palpitations but not specifically.  He has seen a cardiologist in the past and had mild LV dysfunction, he had a cardiac catheterization in 2021 which showed no significant coronary artery disease but elevated filling pressures.  His EKG this admission shows atrial flutter probably atypical flutter with rapid ventricular rate with 2-1 conduction.  Initial rate control dropped his blood pressure so he was admitted for further evaluation.  He is sitting up in a chair, comfortable currently.  No history of bleeding problems    Past History:  Past Medical History:   Diagnosis Date    Asthma, intrinsic     PTSD (post-traumatic stress disorder)     Renal stone      Past Surgical History:   Procedure Laterality Date    APPENDECTOMY      ORTHOPEDIC SURGERY Right     ARM    TRIGGER FINGER RELEASE Left      No Known Allergies  Social History     Socioeconomic History    Marital status:    Tobacco Use    Smoking status: Never     Passive exposure: Never    Smokeless tobacco: Never   Vaping Use    Vaping Use: Never used   Substance and Sexual Activity    Alcohol use: Not Currently    Drug use: Never    Sexual activity: Defer     Family History   Family history unknown: Yes     Medications:  Medications Prior to Admission   Medication Sig Dispense Refill Last Dose    albuterol sulfate  (90 Base) MCG/ACT inhaler Inhale 2 puffs Every 4  (Four) Hours As Needed for Wheezing.       arformoterol (Brovana) 15 MCG/2ML nebulizer solution Take 2 mL by nebulization 2 (Two) Times a Day. 120 mL 11 8/2/2023    atorvastatin (LIPITOR) 40 MG tablet Take 1 tablet by mouth Daily.   8/2/2023    budesonide (Pulmicort) 0.5 MG/2ML nebulizer solution Take 2 mL by nebulization Daily. 120 mL 11 8/2/2023    cetirizine (zyrTEC) 10 MG tablet Take 1 tablet by mouth Daily.   8/2/2023    donepezil (ARICEPT) 5 MG tablet Take 1 tablet by mouth Every Night.   8/2/2023    FLUoxetine (PROzac) 10 MG capsule Take 1 capsule by mouth Every Morning.   8/2/2023    FLUoxetine (PROzac) 20 MG capsule Take 1 capsule by mouth Every Morning.   8/2/2023    furosemide (LASIX) 20 MG tablet Take 1 tablet by mouth Daily.   8/2/2023    hydroCHLOROthiazide (HYDRODIURIL) 25 MG tablet Take 1 tablet by mouth Daily.   8/2/2023    insulin glargine (LANTUS, SEMGLEE) 100 UNIT/ML injection Inject 35 Units under the skin into the appropriate area as directed Every Night.   8/2/2023    ipratropium-albuterol (DUO-NEB) 0.5-2.5 mg/3 ml nebulizer Take 3 mL by nebulization Every 4 (Four) Hours As Needed for Wheezing.       ketotifen (ZADITOR) 0.025 % ophthalmic solution Administer 1 drop to both eyes 2 (Two) Times a Day.   8/2/2023    metFORMIN (GLUCOPHAGE) 1000 MG tablet Take 1 tablet by mouth 2 (Two) Times a Day With Meals.   8/2/2023    potassium chloride (K-DUR,KLOR-CON) 10 MEQ CR tablet Take 1 tablet by mouth Daily.   8/2/2023    revefenacin (Yupelri) 175 MCG/3ML nebulizer solution Take 3 mL by nebulization Daily. 90 mL 11 8/2/2023    tiotropium bromide-olodaterol (STIOLTO RESPIMAT) 2.5-2.5 MCG/ACT aerosol solution inhaler Inhale 2 puffs Daily.   8/2/2023    valsartan (DIOVAN) 160 MG tablet Take 1 tablet by mouth Daily.   8/2/2023    Accu-Chek Softclix Lancets lancets by Other route. Use as instructed        Current medications:  arformoterol, 15 mcg, Nebulization, BID - RT  atorvastatin, 40 mg, Oral,  "Nightly  budesonide, 0.5 mg, Nebulization, Daily - RT  cetirizine, 10 mg, Oral, Daily  enoxaparin, 1 mg/kg, Subcutaneous, Q12H  insulin lispro, 4-24 Units, Subcutaneous, 4x Daily AC & at Bedtime  ketotifen, 1 drop, Both Eyes, BID  methylPREDNISolone sodium succinate, 40 mg, Intravenous, Q12H  senna-docusate sodium, 2 tablet, Oral, BID  sodium chloride, 10 mL, Intravenous, Q12H      Current IV drips:  dilTIAZem, 5-15 mg/hr, Last Rate: 2.5 mg/hr (08/03/23 2349)        Review of Systems   Constitutional: Positive for malaise/fatigue.   HENT: Negative.     Eyes: Negative.    Cardiovascular:  Positive for dyspnea on exertion, leg swelling, orthopnea and palpitations. Negative for chest pain.   Respiratory:  Positive for shortness of breath.    Endocrine: Negative.    Hematologic/Lymphatic: Negative.    Skin: Negative.    Musculoskeletal: Negative.    Gastrointestinal: Negative.    Genitourinary: Negative.    Neurological: Negative.    Psychiatric/Behavioral: Negative.         Physical exam:    /82   Pulse (!) 122   Temp 97.4 øF (36.3 øC) (Oral)   Resp 18   Ht 172.7 cm (68\")   Wt 113 kg (250 lb 3.6 oz)   SpO2 96%   BMI 38.05 kg/mý  Body mass index is 38.05 kg/mý.   SpO2  Min: 95 %  Max: 99 %    General Appearance:   well developed  well nourished, morbidly obese  HENT:   oropharynx moist  lips not cyanotic  Neck:  thyroid not enlarged  supple  Respiratory:  no respiratory distress  normal breath sounds  no rales  Cardiovascular:  no jugular venous distention  Rapid regular rhythm  apical impulse normal  S1 normal, S2 normal  no S3, no S4   no murmur  no rub, no thrill  carotid pulses normal; no bruit  pedal pulses normal  lower extremity edema: 3+ to the thighs  Gastrointestinal:   bowel sounds normal  non-tender  no hepatomegaly, no splenomegaly  Musculoskeletal:  no clubbing of fingers.   normocephalic, head atraumatic  Skin:   warm, dry  Neuro/Psychiatric:  judgement and insight appropriate  normal mood " and affect    Cardiographics:     ECG  (personally reviewed) atrial flutter with rapid ventricular rate   Telemetry:  (personally reviewed)    ECHO: Pending   CATH:     CARDIOLITE:      Lab Review:       Results from last 7 days   Lab Units 08/04/23  0421 08/03/23  1519   WBC 10*3/mm3 7.28 7.92   HEMOGLOBIN g/dL 13.4 14.5   HEMATOCRIT % 40.7 43.8            Results from last 7 days   Lab Units 08/04/23  0421 08/03/23  1519   SODIUM mmol/L 138 140   BUN mg/dL 28* 28*   CREATININE mg/dL 1.44* 1.65*   GLUCOSE mg/dL 131* 120*      Estimated Creatinine Clearance: 54.3 mL/min (A) (by C-G formula based on SCr of 1.44 mg/dL (H)).         Invalid input(s): LDLCALC          Lab Results   Component Value Date    TSH 0.387 08/03/2023        Lab Results   Component Value Date    HGBA1C 7.50 (H) 08/03/2023           No results found for: DIGOXIN   No components found for: DDIMERQUAN     Imaging:   XR Chest 1 View    Result Date: 8/3/2023  PROCEDURE: XR CHEST 1 VW  COMPARISON: None  INDICATIONS: SOB  FINDINGS:  Lordotic positioning.  Heart size and pulmonary vessels within normal limits.  Lungs clear.  Costophrenic angles sharp      Impression:  No active cardiopulmonary disease       MICHAEL SALGADO MD       Electronically Signed and Approved By: MICHAEL SALGADO MD on 8/03/2023 at 16:04                The ASCVD Risk score ([place] DK, et al., 2019) failed to calculate for the following reasons:    Cannot find a previous HDL lab    Cannot find a previous total cholesterol lab      Assessment:      Atrial flutter    SOB (shortness of breath)    Volume overload      Initial cardiac assessment: 77-year-old -American male presents with probably persistent atrial flutter over the past at least a couple weeks, now developing into volume overload progressive shortness of breath and orthopnea consistent with congestive heart failure.  Echo has not been completed yet.  He was hypotensive with initial attempts at rate  control.      Recommendations:  1.  Diuresis to try and improve volume overload over the next 48 hours  2.  Review echo when available  3.  Change to oral anticoagulation  4.  Add low-dose beta-blocker to help with rate control  5.  Ultimately I think the patient probably is going to need ERICK cardioversion to restore sinus rhythm but I would prefer his volume status is much more stable before doing that.  He will be followed clinically over the next 48 hours or so and if he does not convert back to regular sinus rhythm ERICK cardioversion can be considered for Monday                Humza Lafleur MD  8/4/2023    08:43 EDT

## 2023-08-04 NOTE — THERAPY EVALUATION
Acute Care - Speech Language Pathology   Swallow Initial Evaluation [facility]endo[facility]     Patient Name: Andrew Hart  : 1945  MRN: [id number]  Today's Date: 2023               Admit Date: 8/3/2023    Visit Dx:     ICD-10-CM ICD-9-CM   1. Acute congestive heart failure, unspecified heart failure type  I50.9 428.0   2. Atrial flutter, unspecified type  I48.92 427.32     Patient Active Problem List   Diagnosis    Severe persistent asthma with acute exacerbation    SOB (shortness of breath)    Chronic cough    Atrial flutter    Volume overload     Past Medical History:   Diagnosis Date    Asthma, intrinsic     PTSD (post-traumatic stress disorder)     Renal stone      Past Surgical History:   Procedure Laterality Date    APPENDECTOMY      ORTHOPEDIC SURGERY Right     ARM    TRIGGER FINGER RELEASE Left        SLP Recommendation and Plan  SLP Swallowing Diagnosis: swallow WFL/no suspected pharyngeal impairment, suspected esophageal dysphagia (23)  SLP Diet Recommendation: regular textures, thin liquids (23)  Recommended Precautions and Strategies: upright posture during/after eating, small bites of food and sips of liquid (23)  SLP Rec. for Method of Medication Administration: meds whole, as tolerated (23)     Monitor for Signs of Aspiration: notify SLP if any concerns (23)     Swallow Criteria for Skilled Therapeutic Interventions Met: no problems identified which require skilled intervention (23)  Anticipated Discharge Disposition (SLP): No further SLP services warranted (23)     Therapy Frequency (Swallow): evaluation only (23)        Demonstrates Need for Referral to Another Service: gastroenterology, dedicated esophageal assessment (23)                                               SWALLOW EVALUATION (last 72 hours)       SLP Adult Swallow Evaluation       Row Name 23                   Rehab Evaluation  "   Document Type evaluation  -SN        Subjective Information no complaints  -SN        Patient Effort good  -SN        Symptoms Noted During/After Treatment none  -SN           General Information    Current Method of Nutrition NPO  -SN        Prior Level of Function-Swallowing no diet consistency restrictions  -SN        Plans/Goals Discussed with patient;spouse/S.O.  -SN        Barriers to Rehab none identified  -SN        Patient's Goals for Discharge return home;return to PO diet  -SN           Oral Motor Structure and Function    Dentition Assessment natural, present and adequate  -SN        Secretion Management WNL/WFL  -SN        Mucosal Quality moist, healthy  -SN        Gag Response WFL  -SN        Volitional Swallow WFL  -SN        Volitional Cough non-productive  -SN           Oral Musculature and Cranial Nerve Assessment    Oral Motor General Assessment WFL  -SN           General Eating/Swallowing Observations    Respiratory Support Currently in Use room air  -SN        Eating/Swallowing Skills self-fed  -SN        Positioning During Eating upright 90 degree;upright in chair  -SN        Utensils Used spoon;cup;straw  -SN        Consistencies Trialed regular textures;pureed;thin liquids;nectar/syrup-thick liquids  -SN           Clinical Swallow Eval    Oral Prep Phase WFL  -SN        Oral Transit WFL  -SN        Oral Residue WFL  -SN        Pharyngeal Phase no overt signs/symptoms of pharyngeal impairment  -SN        Esophageal Phase suspected esophageal impairment  -SN        Clinical Swallow Evaluation Summary Patient is a 77-year-old male admitted to [facility] on 8/3/2023 secondary to atrial flutter.  He was referred for speech pathology dysphagia evaluation secondary to complaint of difficulty swallowing.  Patient reports history of feeling like solids get \"stuck\", pointing to mid chest.  At bedside, patient exhibits swallow function within a timely manner.  He complained of sensation " sticking mid chest when swallowing solid and pur‚ed consistencies.  Intermittent belching/burping observed.  Laryngeal elevation noted to palpation.  Patient did not demonstrate any overt signs or symptoms of aspiration at bedside however cannot rule out silent aspiration.  -SN           SLP Evaluation Clinical Impression    SLP Swallowing Diagnosis swallow WFL/no suspected pharyngeal impairment;suspected esophageal dysphagia  -SN        Swallow Criteria for Skilled Therapeutic Interventions Met no problems identified which require skilled intervention  -SN           Recommendations    Therapy Frequency (Swallow) evaluation only  -SN        SLP Diet Recommendation regular textures;thin liquids  -SN        Recommended Precautions and Strategies upright posture during/after eating;small bites of food and sips of liquid  -SN        SLP Rec. for Method of Medication Administration meds whole;as tolerated  -SN        Monitor for Signs of Aspiration notify SLP if any concerns  -SN        Demonstrates Need for Referral to Another Service gastroenterology;dedicated esophageal assessment  -SN                  User Key  (r) = Recorded By, (t) = Taken By, (c) = Cosigned By      Initials Name Effective Dates    Ness Tejeda MS-CCC/SLP, CNT 03/31/21 -                     EDUCATION  The patient has been educated in the following areas:   Dysphagia (Swallowing Impairment).     Patient scored level 7 of 7 on functional communication measures for swallowing, indicating 0% limitation in function.  At this time patient does not require any further direct speech pathology services for dysphagia.  Recommend rereferral if patient does demonstrate any decline in status.    Physician may wish further investigation of esophageal phase of swallow based on patient complaint and bedside results.         Time Calculation:    Time Calculation- SLP       Row Name 08/04/23 6918             Time Calculation- SLP    SLP Start Time 0730  -       SLP Stop Time 0830  -SN      SLP Time Calculation (min) 60 min  -SN      SLP Received On 08/04/23  -SN         Untimed Charges    64170-NP Eval Oral Pharyng Swallow Minutes 60  -SN         Total Minutes    Untimed Charges Total Minutes 60  -SN       Total Minutes 60  -SN                User Key  (r) = Recorded By, (t) = Taken By, (c) = Cosigned By      Initials Name Provider Type    SN Ness Phillip MS-CCC/SLP, TOMI Speech and Language Pathologist                    Therapy Charges for Today       Code Description Service Date Service Provider Modifiers Qty    [id number]  ST EVAL ORAL PHARYNG SWALLOW 4 8/4/2023 Ness Phillip MS-CCC/SLP, TOMI GN 1                 ADRI Price/JULIETA, TOMI  8/4/2023

## 2023-08-05 ENCOUNTER — APPOINTMENT (OUTPATIENT)
Dept: GENERAL RADIOLOGY | Facility: HOSPITAL | Age: 78
DRG: 215 | End: 2023-08-05
Payer: OTHER GOVERNMENT

## 2023-08-05 ENCOUNTER — APPOINTMENT (OUTPATIENT)
Dept: ULTRASOUND IMAGING | Facility: HOSPITAL | Age: 78
DRG: 215 | End: 2023-08-05
Payer: OTHER GOVERNMENT

## 2023-08-05 LAB
ANION GAP SERPL CALCULATED.3IONS-SCNC: 11.5 MMOL/L (ref 5–15)
ARTERIAL PATENCY WRIST A: POSITIVE
BASE EXCESS BLDA CALC-SCNC: -2.6 MMOL/L (ref -2–2)
BASOPHILS # BLD AUTO: 0.02 10*3/MM3 (ref 0–0.2)
BASOPHILS NFR BLD AUTO: 0.2 % (ref 0–1.5)
BDY SITE: ABNORMAL
BILIRUB UR QL STRIP: NEGATIVE
BUN SERPL-MCNC: 42 MG/DL (ref 8–23)
BUN/CREAT SERPL: 23 (ref 7–25)
CA-I BLDA-SCNC: 1.18 MMOL/L (ref 1.13–1.32)
CALCIUM SPEC-SCNC: 9.5 MG/DL (ref 8.6–10.5)
CHLORIDE BLDA-SCNC: 101 MMOL/L (ref 98–106)
CHLORIDE SERPL-SCNC: 100 MMOL/L (ref 98–107)
CLARITY UR: CLEAR
CO2 SERPL-SCNC: 23.5 MMOL/L (ref 22–29)
COHGB MFR BLD: 0.6 % (ref 0–1.5)
COLOR UR: YELLOW
CREAT SERPL-MCNC: 1.83 MG/DL (ref 0.76–1.27)
D-LACTATE SERPL-SCNC: 2.5 MMOL/L (ref 0.5–2)
D-LACTATE SERPL-SCNC: 3.1 MMOL/L (ref 0.5–2)
D-LACTATE SERPL-SCNC: 3.6 MMOL/L (ref 0.5–2)
DEPRECATED RDW RBC AUTO: 51.3 FL (ref 37–54)
EGFRCR SERPLBLD CKD-EPI 2021: 38 ML/MIN/1.73
EOSINOPHIL # BLD AUTO: 0 10*3/MM3 (ref 0–0.4)
EOSINOPHIL NFR BLD AUTO: 0 % (ref 0.3–6.2)
ERYTHROCYTE [DISTWIDTH] IN BLOOD BY AUTOMATED COUNT: 17.2 % (ref 12.3–15.4)
FHHB: 4.1 % (ref 0–5)
GAS FLOW AIRWAY: 2 LPM
GLUCOSE BLDA-MCNC: 131 MG/DL (ref 70–99)
GLUCOSE BLDC GLUCOMTR-MCNC: 111 MG/DL (ref 70–99)
GLUCOSE BLDC GLUCOMTR-MCNC: 146 MG/DL (ref 70–99)
GLUCOSE BLDC GLUCOMTR-MCNC: 162 MG/DL (ref 70–99)
GLUCOSE BLDC GLUCOMTR-MCNC: 83 MG/DL (ref 70–99)
GLUCOSE SERPL-MCNC: 102 MG/DL (ref 65–99)
GLUCOSE UR STRIP-MCNC: NEGATIVE MG/DL
HCO3 BLDA-SCNC: 21.9 MMOL/L (ref 22–26)
HCT VFR BLD AUTO: 40.6 % (ref 37.5–51)
HGB BLD-MCNC: 13.7 G/DL (ref 13–17.7)
HGB BLDA-MCNC: 14.7 G/DL (ref 13.8–16.4)
HGB UR QL STRIP.AUTO: NEGATIVE
IMM GRANULOCYTES # BLD AUTO: 0.04 10*3/MM3 (ref 0–0.05)
IMM GRANULOCYTES NFR BLD AUTO: 0.3 % (ref 0–0.5)
INHALED O2 CONCENTRATION: ABNORMAL %
KETONES UR QL STRIP: NEGATIVE
LACTATE BLDA-SCNC: 1.78 MMOL/L (ref 0.5–2)
LEUKOCYTE ESTERASE UR QL STRIP.AUTO: NEGATIVE
LYMPHOCYTES # BLD AUTO: 1.46 10*3/MM3 (ref 0.7–3.1)
LYMPHOCYTES NFR BLD AUTO: 11.5 % (ref 19.6–45.3)
MAGNESIUM SERPL-MCNC: 2.6 MG/DL (ref 1.6–2.4)
MCH RBC QN AUTO: 28.8 PG (ref 26.6–33)
MCHC RBC AUTO-ENTMCNC: 33.7 G/DL (ref 31.5–35.7)
MCV RBC AUTO: 85.5 FL (ref 79–97)
METHGB BLD QL: 0.1 % (ref 0–1.5)
MODALITY: ABNORMAL
MONOCYTES # BLD AUTO: 1.55 10*3/MM3 (ref 0.1–0.9)
MONOCYTES NFR BLD AUTO: 12.2 % (ref 5–12)
NEUTROPHILS NFR BLD AUTO: 75.8 % (ref 42.7–76)
NEUTROPHILS NFR BLD AUTO: 9.66 10*3/MM3 (ref 1.7–7)
NITRITE UR QL STRIP: NEGATIVE
NOTE: ABNORMAL
NRBC BLD AUTO-RTO: 0 /100 WBC (ref 0–0.2)
OXYHGB MFR BLDV: 95.2 % (ref 94–99)
PCO2 BLDA: 37.6 MM HG (ref 35–45)
PH BLDA: 7.38 PH UNITS (ref 7.35–7.45)
PH UR STRIP.AUTO: <=5 [PH] (ref 5–8)
PHOSPHATE SERPL-MCNC: 4.3 MG/DL (ref 2.5–4.5)
PLATELET # BLD AUTO: 262 10*3/MM3 (ref 140–450)
PMV BLD AUTO: 9.5 FL (ref 6–12)
PO2 BLD: ABNORMAL MM[HG]
PO2 BLDA: 93.7 MM HG (ref 80–100)
POTASSIUM BLDA-SCNC: 3.95 MMOL/L (ref 3.5–5)
POTASSIUM SERPL-SCNC: 4.2 MMOL/L (ref 3.5–5.2)
PROT ?TM UR-MCNC: 10.6 MG/DL
PROT UR QL STRIP: NEGATIVE
RBC # BLD AUTO: 4.75 10*6/MM3 (ref 4.14–5.8)
SAO2 % BLDCOA: 95.9 % (ref 95–99)
SODIUM BLDA-SCNC: 135.1 MMOL/L (ref 136–146)
SODIUM SERPL-SCNC: 135 MMOL/L (ref 136–145)
SODIUM UR-SCNC: <20 MMOL/L
SP GR UR STRIP: 1.01 (ref 1–1.03)
UROBILINOGEN UR QL STRIP: NORMAL
WBC NRBC COR # BLD: 12.73 10*3/MM3 (ref 3.4–10.8)

## 2023-08-05 PROCEDURE — 84300 ASSAY OF URINE SODIUM: CPT | Performed by: INTERNAL MEDICINE

## 2023-08-05 PROCEDURE — 25010000002 FUROSEMIDE PER 20 MG: Performed by: INTERNAL MEDICINE

## 2023-08-05 PROCEDURE — 84156 ASSAY OF PROTEIN URINE: CPT | Performed by: INTERNAL MEDICINE

## 2023-08-05 PROCEDURE — 84100 ASSAY OF PHOSPHORUS: CPT | Performed by: INTERNAL MEDICINE

## 2023-08-05 PROCEDURE — 81003 URINALYSIS AUTO W/O SCOPE: CPT | Performed by: INTERNAL MEDICINE

## 2023-08-05 PROCEDURE — 99232 SBSQ HOSP IP/OBS MODERATE 35: CPT | Performed by: INTERNAL MEDICINE

## 2023-08-05 PROCEDURE — 83735 ASSAY OF MAGNESIUM: CPT | Performed by: INTERNAL MEDICINE

## 2023-08-05 PROCEDURE — 63710000001 INSULIN LISPRO (HUMAN) PER 5 UNITS: Performed by: HOSPITALIST

## 2023-08-05 PROCEDURE — 82805 BLOOD GASES W/O2 SATURATION: CPT | Performed by: INTERNAL MEDICINE

## 2023-08-05 PROCEDURE — 74018 RADEX ABDOMEN 1 VIEW: CPT

## 2023-08-05 PROCEDURE — 36600 WITHDRAWAL OF ARTERIAL BLOOD: CPT | Performed by: INTERNAL MEDICINE

## 2023-08-05 PROCEDURE — 82948 REAGENT STRIP/BLOOD GLUCOSE: CPT

## 2023-08-05 PROCEDURE — 82375 ASSAY CARBOXYHB QUANT: CPT | Performed by: INTERNAL MEDICINE

## 2023-08-05 PROCEDURE — 94799 UNLISTED PULMONARY SVC/PX: CPT

## 2023-08-05 PROCEDURE — 83605 ASSAY OF LACTIC ACID: CPT | Performed by: INTERNAL MEDICINE

## 2023-08-05 PROCEDURE — 71045 X-RAY EXAM CHEST 1 VIEW: CPT

## 2023-08-05 PROCEDURE — 25010000002 AMIODARONE IN DEXTROSE 5% 150-4.21 MG/100ML-% SOLUTION: Performed by: INTERNAL MEDICINE

## 2023-08-05 PROCEDURE — 83050 HGB METHEMOGLOBIN QUAN: CPT | Performed by: INTERNAL MEDICINE

## 2023-08-05 PROCEDURE — 85025 COMPLETE CBC W/AUTO DIFF WBC: CPT | Performed by: INTERNAL MEDICINE

## 2023-08-05 PROCEDURE — 25010000002 AMIODARONE IN DEXTROSE 5% 360-4.14 MG/200ML-% SOLUTION: Performed by: INTERNAL MEDICINE

## 2023-08-05 PROCEDURE — 80048 BASIC METABOLIC PNL TOTAL CA: CPT | Performed by: INTERNAL MEDICINE

## 2023-08-05 PROCEDURE — 76775 US EXAM ABDO BACK WALL LIM: CPT

## 2023-08-05 PROCEDURE — 94664 DEMO&/EVAL PT USE INHALER: CPT

## 2023-08-05 PROCEDURE — 63710000001 INSULIN DETEMIR PER 5 UNITS: Performed by: INTERNAL MEDICINE

## 2023-08-05 RX ORDER — NOREPINEPHRINE BITARTRATE 0.03 MG/ML
.02-.3 INJECTION, SOLUTION INTRAVENOUS
Status: DISCONTINUED | OUTPATIENT
Start: 2023-08-05 | End: 2023-08-06 | Stop reason: HOSPADM

## 2023-08-05 RX ORDER — MIDODRINE HYDROCHLORIDE 5 MG/1
5 TABLET ORAL
Status: DISCONTINUED | OUTPATIENT
Start: 2023-08-05 | End: 2023-08-05

## 2023-08-05 RX ORDER — DONEPEZIL HYDROCHLORIDE 5 MG/1
5 TABLET, FILM COATED ORAL NIGHTLY
Status: DISCONTINUED | OUTPATIENT
Start: 2023-08-05 | End: 2023-08-06 | Stop reason: HOSPADM

## 2023-08-05 RX ORDER — FUROSEMIDE 10 MG/ML
40 INJECTION INTRAMUSCULAR; INTRAVENOUS ONCE
Status: COMPLETED | OUTPATIENT
Start: 2023-08-05 | End: 2023-08-05

## 2023-08-05 RX ORDER — MIDODRINE HYDROCHLORIDE 5 MG/1
5 TABLET ORAL ONCE
Status: COMPLETED | OUTPATIENT
Start: 2023-08-05 | End: 2023-08-05

## 2023-08-05 RX ORDER — MIDODRINE HYDROCHLORIDE 10 MG/1
10 TABLET ORAL
Status: DISCONTINUED | OUTPATIENT
Start: 2023-08-06 | End: 2023-08-06 | Stop reason: HOSPADM

## 2023-08-05 RX ORDER — FUROSEMIDE 10 MG/ML
40 INJECTION INTRAMUSCULAR; INTRAVENOUS EVERY 12 HOURS
Status: DISCONTINUED | OUTPATIENT
Start: 2023-08-05 | End: 2023-08-06

## 2023-08-05 RX ADMIN — CETIRIZINE HYDROCHLORIDE 10 MG: 10 TABLET, FILM COATED ORAL at 09:05

## 2023-08-05 RX ADMIN — AMIODARONE HYDROCHLORIDE 150 MG: 1.5 INJECTION, SOLUTION INTRAVENOUS at 16:31

## 2023-08-05 RX ADMIN — AMIODARONE HYDROCHLORIDE 0.5 MG/MIN: 1.8 INJECTION, SOLUTION INTRAVENOUS at 23:26

## 2023-08-05 RX ADMIN — INSULIN DETEMIR 15 UNITS: 100 INJECTION, SOLUTION SUBCUTANEOUS at 21:31

## 2023-08-05 RX ADMIN — Medication 10 ML: at 09:10

## 2023-08-05 RX ADMIN — KETOTIFEN FUMARATE 1 DROP: 0.25 SOLUTION/ DROPS OPHTHALMIC at 21:31

## 2023-08-05 RX ADMIN — FUROSEMIDE 40 MG: 10 INJECTION, SOLUTION INTRAMUSCULAR; INTRAVENOUS at 20:46

## 2023-08-05 RX ADMIN — FUROSEMIDE 40 MG: 10 INJECTION, SOLUTION INTRAMUSCULAR; INTRAVENOUS at 17:50

## 2023-08-05 RX ADMIN — INSULIN LISPRO 4 UNITS: 100 INJECTION, SOLUTION INTRAVENOUS; SUBCUTANEOUS at 21:31

## 2023-08-05 RX ADMIN — AMIODARONE HYDROCHLORIDE 1 MG/MIN: 1.8 INJECTION, SOLUTION INTRAVENOUS at 17:46

## 2023-08-05 RX ADMIN — METOPROLOL SUCCINATE 25 MG: 25 TABLET, EXTENDED RELEASE ORAL at 09:05

## 2023-08-05 RX ADMIN — BUDESONIDE 0.5 MG: 0.5 SUSPENSION RESPIRATORY (INHALATION) at 07:44

## 2023-08-05 RX ADMIN — INSULIN DETEMIR 15 UNITS: 100 INJECTION, SOLUTION SUBCUTANEOUS at 09:05

## 2023-08-05 RX ADMIN — MIDODRINE HYDROCHLORIDE 5 MG: 5 TABLET ORAL at 12:14

## 2023-08-05 RX ADMIN — SENNOSIDES AND DOCUSATE SODIUM 2 TABLET: 50; 8.6 TABLET ORAL at 09:06

## 2023-08-05 RX ADMIN — ARFORMOTEROL TARTRATE 15 MCG: 15 SOLUTION RESPIRATORY (INHALATION) at 07:45

## 2023-08-05 RX ADMIN — ARFORMOTEROL TARTRATE 15 MCG: 15 SOLUTION RESPIRATORY (INHALATION) at 18:28

## 2023-08-05 RX ADMIN — APIXABAN 5 MG: 5 TABLET, FILM COATED ORAL at 21:32

## 2023-08-05 RX ADMIN — KETOTIFEN FUMARATE 1 DROP: 0.25 SOLUTION/ DROPS OPHTHALMIC at 09:09

## 2023-08-05 RX ADMIN — FUROSEMIDE 60 MG: 10 INJECTION, SOLUTION INTRAMUSCULAR; INTRAVENOUS at 09:06

## 2023-08-05 RX ADMIN — ATORVASTATIN CALCIUM 40 MG: 40 TABLET, FILM COATED ORAL at 21:32

## 2023-08-05 RX ADMIN — DONEPEZIL HYDROCHLORIDE 5 MG: 5 TABLET ORAL at 21:32

## 2023-08-05 RX ADMIN — MIDODRINE HYDROCHLORIDE 5 MG: 5 TABLET ORAL at 17:00

## 2023-08-05 RX ADMIN — SENNOSIDES AND DOCUSATE SODIUM 2 TABLET: 50; 8.6 TABLET ORAL at 21:32

## 2023-08-05 RX ADMIN — MIDODRINE HYDROCHLORIDE 5 MG: 5 TABLET ORAL at 18:43

## 2023-08-05 RX ADMIN — HYDROXYZINE HYDROCHLORIDE 25 MG: 25 TABLET, FILM COATED ORAL at 22:48

## 2023-08-05 RX ADMIN — APIXABAN 5 MG: 5 TABLET, FILM COATED ORAL at 09:05

## 2023-08-05 NOTE — PROGRESS NOTES
Referring Provider: Rick Quick MD    Reason for follow-up: Atrial fibrillation     Patient Care Team:  Torres Acevedo MD as PCP - General (Internal Medicine)      SUBJECTIVE  Complains of shortness of breath.  He has been sleeping on a recliner for the last 2 weeks.  Has questions about cardioversion.     ROS  Review of all systems negative except as indicated.    Since I have last seen, the patient has been without any chest discomfort, shortness of breath, palpitations, dizziness or syncope.  Denies having any headache, abdominal pain, nausea, vomiting, diarrhea, constipation, loss of weight or loss of appetite.  Denies having any excessive bruising, hematuria or blood in the stool.  ROS      Personal History:    Past Medical History:   Diagnosis Date    Asthma, intrinsic     PTSD (post-traumatic stress disorder)     Renal stone        Past Surgical History:   Procedure Laterality Date    APPENDECTOMY      ORTHOPEDIC SURGERY Right     ARM    TRIGGER FINGER RELEASE Left        Family History   Family history unknown: Yes       Social History     Tobacco Use    Smoking status: Never     Passive exposure: Never    Smokeless tobacco: Never   Vaping Use    Vaping Use: Never used   Substance Use Topics    Alcohol use: Not Currently    Drug use: Never        Medications Prior to Admission   Medication Sig Dispense Refill Last Dose    albuterol sulfate  (90 Base) MCG/ACT inhaler Inhale 2 puffs Every 4 (Four) Hours As Needed for Wheezing.       arformoterol (Brovana) 15 MCG/2ML nebulizer solution Take 2 mL by nebulization 2 (Two) Times a Day. 120 mL 11 8/2/2023    atorvastatin (LIPITOR) 40 MG tablet Take 1 tablet by mouth Daily.   8/2/2023    budesonide (Pulmicort) 0.5 MG/2ML nebulizer solution Take 2 mL by nebulization Daily. 120 mL 11 8/2/2023    cetirizine (zyrTEC) 10 MG tablet Take 1 tablet by mouth Daily.   8/2/2023    donepezil (ARICEPT) 5 MG tablet Take 1 tablet by mouth Every Night.   8/2/2023     FLUoxetine (PROzac) 10 MG capsule Take 1 capsule by mouth Every Morning.   8/2/2023    FLUoxetine (PROzac) 20 MG capsule Take 1 capsule by mouth Every Morning.   8/2/2023    furosemide (LASIX) 20 MG tablet Take 1 tablet by mouth Daily.   8/2/2023    hydroCHLOROthiazide (HYDRODIURIL) 25 MG tablet Take 1 tablet by mouth Daily.   8/2/2023    insulin glargine (LANTUS, SEMGLEE) 100 UNIT/ML injection Inject 35 Units under the skin into the appropriate area as directed Every Night.   8/2/2023    ipratropium-albuterol (DUO-NEB) 0.5-2.5 mg/3 ml nebulizer Take 3 mL by nebulization Every 4 (Four) Hours As Needed for Wheezing.       ketotifen (ZADITOR) 0.025 % ophthalmic solution Administer 1 drop to both eyes 2 (Two) Times a Day.   8/2/2023    metFORMIN (GLUCOPHAGE) 1000 MG tablet Take 1 tablet by mouth 2 (Two) Times a Day With Meals.   8/2/2023    potassium chloride (K-DUR,KLOR-CON) 10 MEQ CR tablet Take 1 tablet by mouth Daily.   8/2/2023    revefenacin (Yupelri) 175 MCG/3ML nebulizer solution Take 3 mL by nebulization Daily. 90 mL 11 8/2/2023    tiotropium bromide-olodaterol (STIOLTO RESPIMAT) 2.5-2.5 MCG/ACT aerosol solution inhaler Inhale 2 puffs Daily.   8/2/2023    valsartan (DIOVAN) 160 MG tablet Take 1 tablet by mouth Daily.   8/2/2023    Accu-Chek Softclix Lancets lancets by Other route. Use as instructed          Allergies:  Patient has no known allergies.    Scheduled Meds:apixaban, 5 mg, Oral, Q12H  arformoterol, 15 mcg, Nebulization, BID - RT  atorvastatin, 40 mg, Oral, Nightly  budesonide, 0.5 mg, Nebulization, Daily - RT  cetirizine, 10 mg, Oral, Daily  insulin detemir, 15 Units, Subcutaneous, Q12H  insulin lispro, 4-24 Units, Subcutaneous, 4x Daily AC & at Bedtime  ketotifen, 1 drop, Both Eyes, BID  metoprolol succinate XL, 25 mg, Oral, Q12H  midodrine, 5 mg, Oral, TID AC  senna-docusate sodium, 2 tablet, Oral, BID  sodium chloride, 10 mL, Intravenous, Q12H      Continuous Infusions:   PRN Meds:.   "acetaminophen    aluminum-magnesium hydroxide-simethicone    senna-docusate sodium **AND** [DISCONTINUED] polyethylene glycol **AND** bisacodyl **AND** bisacodyl    dextrose    dextrose    Diclofenac Sodium    glucagon (human recombinant)    hydrOXYzine    ipratropium-albuterol    lidocaine    melatonin    nicotine    ondansetron    polyethylene glycol    sodium chloride    sodium chloride    sodium chloride      OBJECTIVE    Vital Signs  Vitals:    08/05/23 0750 08/05/23 0757 08/05/23 0803 08/05/23 1111   BP:    90/64   BP Location:    Right arm   Patient Position:    Sitting   Pulse: (!) 122   (!) 124   Resp:       Temp:  93.1 øF (33.9 øC) 98.5 øF (36.9 øC) 97.3 øF (36.3 øC)   TempSrc:  Oral Rectal Axillary   SpO2:    100%   Weight:       Height:           Flowsheet Rows      Flowsheet Row First Filed Value   Admission Height 172.7 cm (68\") Documented at 08/03/2023 1512   Admission Weight 113 kg (250 lb 3.6 oz) Documented at 08/03/2023 1808              Intake/Output Summary (Last 24 hours) at 8/5/2023 1355  Last data filed at 8/5/2023 1244  Gross per 24 hour   Intake 708 ml   Output 1100 ml   Net -392 ml          Telemetry: Atrial flutter with heart rate in the 110  to 120    Physical Exam:  The patient is alert, oriented and in no distress.  Obese  Vital signs as noted above.  Head and neck revealed no carotid bruits but + jugular venous distention.  No thyromegaly or lymphadenopathy is present  Lungs clear.  No wheezing.  Breath sounds are normal bilaterally.  Heart normal first and second heart sounds.  No murmur. No precordial rub is present.  No gallop is present.  Abdomen soft and nontender.  No organomegaly is present.  Extremities with good peripheral pulses with 2+ pedal edema  Skin warm and dry.  Musculoskeletal system is grossly normal.  CNS grossly normal.       Results Review:  I have personally reviewed the results from the time of this admission to 8/5/2023 13:55 EDT and agree with these " findings:  []  Laboratory  []  Microbiology  []  Radiology  []  EKG/Telemetry   []  Cardiology/Vascular   []  Pathology  []  Old records  []  Other:    Most notable findings include:    Lab Results (last 24 hours)       Procedure Component Value Units Date/Time    Urinalysis With Microscopic If Indicated (No Culture) - Urine, Clean Catch [[id number]]  (Normal) Collected: 08/05/23 1325    Specimen: Urine, Clean Catch Updated: 08/05/23 1338     Color, UA Yellow     Appearance, UA Clear     pH, UA <=5.0     Specific Gravity, UA 1.012     Glucose, UA Negative     Ketones, UA Negative     Bilirubin, UA Negative     Blood, UA Negative     Protein, UA Negative     Leuk Esterase, UA Negative     Nitrite, UA Negative     Urobilinogen, UA 1.0 E.U./dL    Narrative:      Urine microscopic not indicated.    Sodium, Urine, Random - Urine, Clean Catch [[id number]] Collected: 08/05/23 1325    Specimen: Urine, Clean Catch Updated: 08/05/23 1331    Protein, Urine, Random - Urine, Clean Catch [[id number]] Collected: 08/05/23 1325    Specimen: Urine, Clean Catch Updated: 08/05/23 1331    POC Glucose Once [[id number]]  (Abnormal) Collected: 08/05/23 1202    Specimen: Blood Updated: 08/05/23 1205     Glucose 111 mg/dL      Comment: Serial Number: [id number]:  [id number]       POC Glucose Once [[id number]]  (Normal) Collected: 08/05/23 0736    Specimen: Blood Updated: 08/05/23 0743     Glucose 83 mg/dL      Comment: Serial Number: [id number]:  [id number]       Magnesium [[id number]]  (Abnormal) Collected: 08/05/23 0556    Specimen: Blood Updated: 08/05/23 0640     Magnesium 2.6 mg/dL     Basic Metabolic Panel [[id number]]  (Abnormal) Collected: 08/05/23 0556    Specimen: Blood Updated: 08/05/23 0640     Glucose 102 mg/dL      BUN 42 mg/dL      Creatinine 1.83 mg/dL      Sodium 135 mmol/L      Potassium 4.2 mmol/L      Chloride 100 mmol/L      CO2 23.5 mmol/L      Calcium 9.5 mg/dL      BUN/Creatinine Ratio 23.0     Anion Gap 11.5  mmol/L      eGFR 38.0 mL/min/1.73     Narrative:      GFR Normal >60  Chronic Kidney Disease <60  Kidney Failure <15    The GFR formula is only valid for adults with stable renal function between ages 18 and 70.    Phosphorus [[id number]]  (Normal) Collected: 08/05/23 0556    Specimen: Blood Updated: 08/05/23 0631     Phosphorus 4.3 mg/dL     CBC & Differential [[id number]]  (Abnormal) Collected: 08/05/23 0556    Specimen: Blood Updated: 08/05/23 0630    Narrative:      The following orders were created for panel order CBC & Differential.  Procedure                               Abnormality         Status                     ---------                               -----------         ------                     CBC Auto Differential[[id number]]        Abnormal            Final result                 Please view results for these tests on the individual orders.    CBC Auto Differential [[id number]]  (Abnormal) Collected: 08/05/23 0556    Specimen: Blood Updated: 08/05/23 0630     WBC 12.73 10*3/mm3      RBC 4.75 10*6/mm3      Hemoglobin 13.7 g/dL      Hematocrit 40.6 %      MCV 85.5 fL      MCH 28.8 pg      MCHC 33.7 g/dL      RDW 17.2 %      RDW-SD 51.3 fl      MPV 9.5 fL      Platelets 262 10*3/mm3      Neutrophil % 75.8 %      Lymphocyte % 11.5 %      Monocyte % 12.2 %      Eosinophil % 0.0 %      Basophil % 0.2 %      Immature Grans % 0.3 %      Neutrophils, Absolute 9.66 10*3/mm3      Lymphocytes, Absolute 1.46 10*3/mm3      Monocytes, Absolute 1.55 10*3/mm3      Eosinophils, Absolute 0.00 10*3/mm3      Basophils, Absolute 0.02 10*3/mm3      Immature Grans, Absolute 0.04 10*3/mm3      nRBC 0.0 /100 WBC     POC Glucose Once [[id number]]  (Abnormal) Collected: 08/04/23 2018    Specimen: Blood Updated: 08/04/23 2020     Glucose 262 mg/dL      Comment: Serial Number: [id number]:  [id number]       POC Glucose Once [[id number]]  (Abnormal) Collected: 08/04/23 0806    Specimen: Blood Updated: 08/04/23 2019     Glucose  125 mg/dL      Comment: Serial Number: [id number]:  [id number]       POC Glucose Once [[id number]]  (Abnormal) Collected: 08/04/23 1633    Specimen: Blood Updated: 08/04/23 1637     Glucose 305 mg/dL      Comment: Serial Number: [id number]:  [id number]               Imaging Results (Last 24 Hours)       ** No results found for the last 24 hours. **            LAB RESULTS (LAST 7 DAYS)    CBC  Results from last 7 days   Lab Units 08/05/23 0556 08/04/23 0421 08/03/23  1519   WBC 10*3/mm3 12.73* 7.28 7.92   RBC 10*6/mm3 4.75 4.70 5.06   HEMOGLOBIN g/dL 13.7 13.4 14.5   HEMATOCRIT % 40.6 40.7 43.8   MCV fL 85.5 86.6 86.6   PLATELETS 10*3/mm3 262 226 243       BMP  Results from last 7 days   Lab Units 08/05/23 0556 08/04/23 0421 08/03/23  1519   SODIUM mmol/L 135* 138 140   POTASSIUM mmol/L 4.2 3.8 4.1   CHLORIDE mmol/L 100 104 102   CO2 mmol/L 23.5 22.1 25.5   BUN mg/dL 42* 28* 28*   CREATININE mg/dL 1.83* 1.44* 1.65*   GLUCOSE mg/dL 102* 131* 120*   MAGNESIUM mg/dL 2.6* 2.3 2.1   PHOSPHORUS mg/dL 4.3 4.1  --        CMP   Results from last 7 days   Lab Units 08/05/23 0556 08/04/23 0421 08/03/23  1519   SODIUM mmol/L 135* 138 140   POTASSIUM mmol/L 4.2 3.8 4.1   CHLORIDE mmol/L 100 104 102   CO2 mmol/L 23.5 22.1 25.5   BUN mg/dL 42* 28* 28*   CREATININE mg/dL 1.83* 1.44* 1.65*   GLUCOSE mg/dL 102* 131* 120*   ALBUMIN g/dL  --   --  4.3   BILIRUBIN mg/dL  --   --  0.7   ALK PHOS U/L  --   --  123*   AST (SGOT) U/L  --   --  78*   ALT (SGPT) U/L  --   --  158*       BNP        TROPONIN  Results from last 7 days   Lab Units 08/03/23  1753   HSTROP T ng/L 60*       CoAg        Creatinine Clearance  Estimated Creatinine Clearance: 42.7 mL/min (A) (by C-G formula based on SCr of 1.83 mg/dL (H)).    ABG        Radiology  XR Chest 1 View    Result Date: 8/3/2023   No active cardiopulmonary disease       MICHAEL SALGADO MD       Electronically Signed and Approved By: MICHAEL SALGADO MD on 8/03/2023 at 16:04                 EKG  I personally viewed and interpreted the patient's EKG/Telemetry data:  ECG 12 Lead QT Measurement   Preliminary Result   HEART RATE= 123  bpm   RR Interval= 488  ms   MN Interval= 167  ms   P Horizontal Axis= 251  deg   P Front Axis= 212  deg   QRSD Interval= 102  ms   QT Interval= 349  ms   QRS Axis= -64  deg   T Wave Axis= -27  deg   - ABNORMAL ECG -   Sinus or ectopic atrial tachycardia   Ventricular premature complex   Inferior infarct, old   Probable anteroseptal infarct, old   Borderline ST elevation, lateral leads   Electronically Signed By:    Date and Time of Study: 2023-08-04 17:03:18      ECG 12 Lead Tachycardia   Final Result   HEART RATE= 121  bpm   RR Interval= 496  ms   MN Interval= 211  ms   P Horizontal Axis=   deg   P Front Axis= 69  deg   QRSD Interval= 100  ms   QT Interval= 394  ms   QRS Axis= -72  deg   T Wave Axis= 56  deg   - ABNORMAL ECG -   Sinus vs atrial tachycardia    Prolonged MN interval   IVCD, consider RBBB   Prolonged QT interval   When compared with ECG of 03-Aug-2023 17:58:27,   Significant axis, voltage or hypertrophy change   Electronically Signed By: Law Mckay ([facility]) 04-Aug-2023 08:40:30   Date and Time of Study: 2023-08-03 23:55:49      ECG 12 Lead Rhythm Change   Final Result   HEART RATE= 122  bpm   RR Interval= 484  ms   MN Interval= 196  ms   P Horizontal Axis= 5  deg   P Front Axis= 0  deg   QRSD Interval= 110  ms   QT Interval= 376  ms   QRS Axis= -71  deg   T Wave Axis= 62  deg   - ABNORMAL ECG -   Sinus vs atrial tachycardia    Multiple ventricular premature complexes   Probable left atrial enlargement   IVCD, consider RBBB   Inferior infarct, old   Probable anteroseptal infarct, old   Prolonged QT interval   When compared with ECG of 03-Aug-2023 15:24:01,   New or worsened ischemia or infarction   Significant repolarization change   Electronically Signed By: Law Mckay ([facility]) 04-Aug-2023 08:40:02   Date and Time of Study: 2023-08-03 17:58:27       ECG 12 Lead ED Triage Standing Order; SOA   Preliminary Result   HEART RATE= 127  bpm   RR Interval= 472  ms   AL Interval= 151  ms   P Horizontal Axis= -61  deg   P Front Axis= 90  deg   QRSD Interval= 124  ms   QT Interval= 328  ms   QRS Axis= -69  deg   T Wave Axis= 60  deg   - ABNORMAL ECG -   Sinus tachycardia   IVCD, consider RBBB   ST elevation secondary to IVCD   Electronically Signed By:    Date and Time of Study: 2023-08-03 15:24:01      SCANNED - TELEMETRY     Final Result            Echocardiogram:    Results for orders placed during the hospital encounter of 08/03/23    Adult Transthoracic Echo Complete W/ Cont if Necessary Per Protocol    Interpretation Summary    Left ventricular systolic function is severely decreased. Calculated left ventricular EF = 21.2% Left ventricular ejection fraction appears to be 21 - 25%.    Left ventricular diastolic dysfunction is noted.    The right ventricular cavity is dilated.    The left atrial cavity is dilated.    The right atrial cavity is dilated.    Moderate tricuspid valve regurgitation is present.    Estimated right ventricular systolic pressure from tricuspid regurgitation is mildly elevated (35-45 mmHg).        Stress Test:         Cardiac Catheterization:  No results found for this or any previous visit.         Other:         ASSESSMENT & PLAN:    Principal Problem:    Atrial flutter  Active Problems:    SOB (shortness of breath)    Volume overload    Atrial flutter with rapid ventricular rate  [id number] score is 3.  Started anticoagulation with Eliquis 5 mg p.o. twice daily.  Currently on metoprolol.  Unable to uptitrate metoprolol as systolic blood pressure has been in the 90s.  We will add amiodarone for better heart rate control  Avoid using Cardizem due to low EF and avoid digoxin due to chronic kidney disease.    HFrEF  New onset cardiomyopathy with a EF of 21 to 25%.  Unable to add GDMT due to low blood pressure and renal dysfunction.  We  will restart IV diuretics  Monitor I's and O's and replace electrolytes as needed.  Hopefully his renal function will improve after diuretics  Will need a right and left heart cath to better assess hemodynamics and to rule out coronary disease once renal function and respiratory status improves.    Hypotension  He has been started on midodrine    Chronic kidney disease  Creatinine is 1.8 and GFR is 38    Diabetes  A1c 7.5.  Insulin sliding scale during inpatient stay.    Hyperlipidemia  Goal LDL is less than 70  Currently on atorvastatin  Obtain repeat lipid panel    Obesity  BMI is 38  Screening and treatment for sleep apnea        Kun Patel MD  08/05/23  13:55 EDT

## 2023-08-05 NOTE — PROGRESS NOTES
[facility]   Hospitalist Progress Note    Date of admission: 8/3/2023  Patient Name: Andrew Hart  1945  Date: 8/5/2023      Subjective     Chief Complaint   Patient presents with    Shortness of Breath    Edema       Summary: 77-year-old male with 2 weeks of worsening shortness of air lower extremity edema presents with a flutter with RVR, new onset systolic CHF exacerbation.  Cardiology consulted/assisting.  Nephrology consulted for LUZMARIA    Interval Followup:  Some chest discomfort noted intermittently this morning.  Still with shortness of air and dyspnea with exertion.  Has been urinating fairly frequently since yesterday.  Denies fevers    Objective     Vitals:   Temp:  [93.1 øF (33.9 øC)-98.5 øF (36.9 øC)] 97.3 øF (36.3 øC)  Heart Rate:  [] 124  Resp:  [15-16] 15  BP: ()/(60-89) 90/64  Flow (L/min):  [2] 2    Physical Exam  Awake, conversant in chair, family at bedside  Clear to auscultation bilaterally apart from some faint crackles in lower lung fields, on nasal cannula  Elevated rate irregular regular rhythm bilateral lower extremity pitting edema  Abdomen soft nontender nondistended  Alert to self basic conversation, poor insight    Result Review:  Vital signs, labs and recent relevant imaging reviewed.        acetaminophen    aluminum-magnesium hydroxide-simethicone    senna-docusate sodium **AND** [DISCONTINUED] polyethylene glycol **AND** bisacodyl **AND** bisacodyl    dextrose    dextrose    Diclofenac Sodium    glucagon (human recombinant)    hydrOXYzine    ipratropium-albuterol    lidocaine    melatonin    nicotine    ondansetron    polyethylene glycol    sodium chloride    sodium chloride    sodium chloride    amiodarone, 150 mg, Intravenous, Once  apixaban, 5 mg, Oral, Q12H  arformoterol, 15 mcg, Nebulization, BID - RT  atorvastatin, 40 mg, Oral, Nightly  budesonide, 0.5 mg, Nebulization, Daily - RT  cetirizine, 10 mg, Oral, Daily  furosemide, 40 mg, Intravenous,  Q12H  insulin detemir, 15 Units, Subcutaneous, Q12H  insulin lispro, 4-24 Units, Subcutaneous, 4x Daily AC & at Bedtime  ketotifen, 1 drop, Both Eyes, BID  metoprolol succinate XL, 25 mg, Oral, Q12H  midodrine, 5 mg, Oral, TID AC  senna-docusate sodium, 2 tablet, Oral, BID  sodium chloride, 10 mL, Intravenous, Q12H        Assessment / Plan     Assessment/Plan:  New onset atrial flutter with RVR  New onset systolic CHF exacerbation  COPD with exacerbation  NSTEMI type II secondary to volume overload and RVR  LUZMARIA suspect cardiorenal, previous creatinine on 3/6/2023 at 1.2  History of dementia  IDDM2 with hyperglycemia in setting of steroids  Prolonged QTC    Hypotensive, but increasing creatinine but still grossly volume overloaded   Continue IV Lasix, repeat PVR monitor for retention, will ask nephrology to see and assist.  Concern for cardiorenal/hypotension contributing.  Potassium stable, monitor repletion closely given renal dysfunction   Started midodrine for hypotension hopefully can wean off once maps consistently greater than 65   Discussed with cardiology appreciate assistance, changing rate controlling medications adding amiodarone IV, continue metoprolol succinate as tolerated  Echo resulting with reduced EF, additional cath in work-up as per cardiology recs reviewed  Continue on current inhalers, respiratory hygiene, no acute wheezing, defer additional steroids, white count slight increase today suspect reactive from steroids yesterday.    Continue insulin, titrate monitor blood glucose  Repeat EKG during improvement in QTc, resume home donepezil   Speech therapy evaluation given questionable choking episode, appreciate assistance  PT OT, may benefit from placement  Check a.m. CBC, BMP, magnesium, phosphorus, close I&O monitoring, 2 g sodium diet fluid restriction   continue hospital monitoring and treatment at current level of care - does not need upgraded at this time.    Cont close inpatient monitoring        DVT prophylaxis:  Medical and mechanical DVT prophylaxis orders are present.    Level Of Support Discussed With: Patient  Code Status (Patient has no pulse and is not breathing): CPR (Attempt to Resuscitate)  Medical Interventions (Patient has pulse or is breathing): Full Support  Release to patient: Routine Release        CBC          8/3/2023    15:19 8/4/2023    04:21 8/5/2023    05:56   CBC   WBC 7.92  7.28  12.73    RBC 5.06  4.70  4.75    Hemoglobin 14.5  13.4  13.7    Hematocrit 43.8  40.7  40.6    MCV 86.6  86.6  85.5    MCH 28.7  28.5  28.8    MCHC 33.1  32.9  33.7    RDW 17.3  17.2  17.2    Platelets 243  226  262      CMP          8/3/2023    15:19 8/4/2023    04:21 8/5/2023    05:56   CMP   Glucose 120  131  102    BUN 28  28  42    Creatinine 1.65  1.44  1.83    EGFR 43.0  50.7  38.0    Sodium 140  138  135    Potassium 4.1  3.8  4.2    Chloride 102  104  100    Calcium 9.7  9.3  9.5    Total Protein 7.6      Albumin 4.3      Globulin 3.3      Total Bilirubin 0.7      Alkaline Phosphatase 123      AST (SGOT) 78      ALT (SGPT) 158      Albumin/Globulin Ratio 1.3      BUN/Creatinine Ratio 17.0  19.4  23.0    Anion Gap 12.5  11.9  11.5      Patient is critically ill due to A-fib with RVR, hypotension requiring vasopressor support with midodrine, IV amiodarone drip.  I have spent >31 minutes of critical care time reviewing documentation, pertinent labs, imaging studies, examining the patient, modifying care plan, and discussing patient's condition and care plan with the patient, nursing and cardiology nephro and pt's family.

## 2023-08-05 NOTE — PLAN OF CARE
Goal Outcome Evaluation:  Plan of Care Reviewed With: patient        Progress: no change  Outcome Evaluation: pt BP soft during the shift. hospitalist PA aware and medication was adjusted. pt complaining of SOA intermittently, otherwise no new issues at this time. possible cardioversion on Monday.

## 2023-08-05 NOTE — NURSING NOTE
Loading dose of amiodarone given 1631 and patients blood pressure began to trend down. Patient became more drowsy and O2 sats dropped into 80s on the 2L NC he had been wearing all day. Held lasix and notified Cardiologist Dr. Patel. Called RRT per recommendation. Patient blood pressure and O2 had recovered by the time RRT RN arrived. Dr. Patel said to give lasix and restart Amiodarone drip. After restarting Dr. Quick was notified of RRT and when notifying, patient blood pressure dropped again while on the amiodarone drip. Orders added to transfer patient off the unit and give additional dose midodrine. When moving patient off the unit he experienced flushing, light headedness, and blood pressure spiked to 148/134. Blood pressure trended back to 64/50 during transport to unit. Unit RN notified of event, will pass on. Tori Soto RN

## 2023-08-06 VITALS
RESPIRATION RATE: 20 BRPM | HEART RATE: 107 BPM | WEIGHT: 253.75 LBS | HEIGHT: 68 IN | DIASTOLIC BLOOD PRESSURE: 77 MMHG | TEMPERATURE: 97.9 F | SYSTOLIC BLOOD PRESSURE: 107 MMHG | OXYGEN SATURATION: 99 % | BODY MASS INDEX: 38.46 KG/M2

## 2023-08-06 PROBLEM — I27.20 PULMONARY HTN: Status: ACTIVE | Noted: 2023-08-03

## 2023-08-06 PROBLEM — I50.21 ACUTE HFREF (HEART FAILURE WITH REDUCED EJECTION FRACTION): Status: ACTIVE | Noted: 2023-08-03

## 2023-08-06 LAB
ACT BLD: 323 SECONDS (ref 89–137)
ALBUMIN SERPL-MCNC: 3.5 G/DL (ref 3.5–5.2)
ALP SERPL-CCNC: 110 U/L (ref 39–117)
ALT SERPL W P-5'-P-CCNC: 1485 U/L (ref 1–41)
ANION GAP SERPL CALCULATED.3IONS-SCNC: 17.4 MMOL/L (ref 5–15)
AST SERPL-CCNC: 1637 U/L (ref 1–40)
BASE EXCESS BLDV CALC-SCNC: -3.5 MMOL/L (ref -2–2)
BASE EXCESS BLDV CALC-SCNC: -4.2 MMOL/L (ref -2–2)
BASOPHILS # BLD AUTO: 0.02 10*3/MM3 (ref 0–0.2)
BASOPHILS NFR BLD AUTO: 0.2 % (ref 0–1.5)
BDY SITE: ABNORMAL
BDY SITE: ABNORMAL
BILIRUB CONJ SERPL-MCNC: 0.5 MG/DL (ref 0–0.3)
BILIRUB INDIRECT SERPL-MCNC: 0.3 MG/DL
BILIRUB SERPL-MCNC: 0.8 MG/DL (ref 0–1.2)
BUN SERPL-MCNC: 58 MG/DL (ref 8–23)
BUN/CREAT SERPL: 22.7 (ref 7–25)
CALCIUM SPEC-SCNC: 9.6 MG/DL (ref 8.6–10.5)
CHLORIDE SERPL-SCNC: 98 MMOL/L (ref 98–107)
CHOLEST SERPL-MCNC: 81 MG/DL (ref 0–200)
CO2 SERPL-SCNC: 19.6 MMOL/L (ref 22–29)
COHGB MFR BLD: 0.1 % (ref 0–1.5)
COHGB MFR BLD: 0.3 % (ref 0–1.5)
CREAT SERPL-MCNC: 2.56 MG/DL (ref 0.76–1.27)
D-LACTATE SERPL-SCNC: 2.4 MMOL/L (ref 0.5–2)
D-LACTATE SERPL-SCNC: 3.1 MMOL/L (ref 0.5–2)
D-LACTATE SERPL-SCNC: 3.2 MMOL/L (ref 0.5–2)
DEPRECATED RDW RBC AUTO: 49.7 FL (ref 37–54)
EGFRCR SERPLBLD CKD-EPI 2021: 25.4 ML/MIN/1.73
EOSINOPHIL # BLD AUTO: 0 10*3/MM3 (ref 0–0.4)
EOSINOPHIL NFR BLD AUTO: 0 % (ref 0.3–6.2)
ERYTHROCYTE [DISTWIDTH] IN BLOOD BY AUTOMATED COUNT: 16.7 % (ref 12.3–15.4)
FHHB: 46.8 % (ref 0–5)
FHHB: 54.2 % (ref 0–5)
GLUCOSE BLDC GLUCOMTR-MCNC: 162 MG/DL (ref 70–99)
GLUCOSE BLDC GLUCOMTR-MCNC: 169 MG/DL (ref 70–99)
GLUCOSE SERPL-MCNC: 131 MG/DL (ref 65–99)
HCO3 BLDV-SCNC: 22.1 MMOL/L (ref 22–26)
HCO3 BLDV-SCNC: 23.4 MMOL/L (ref 22–26)
HCT VFR BLD AUTO: 43.5 % (ref 37.5–51)
HDLC SERPL-MCNC: 36 MG/DL (ref 40–60)
HGB BLD-MCNC: 14.9 G/DL (ref 13–17.7)
HGB BLDA-MCNC: 14.2 G/DL (ref 13.8–16.4)
HGB BLDA-MCNC: 14.8 G/DL (ref 13.8–16.4)
IMM GRANULOCYTES # BLD AUTO: 0.08 10*3/MM3 (ref 0–0.05)
IMM GRANULOCYTES NFR BLD AUTO: 0.7 % (ref 0–0.5)
INHALED O2 CONCENTRATION: 50 %
INHALED O2 CONCENTRATION: 50 %
INR PPP: 2.64 (ref 0.86–1.15)
LDLC SERPL CALC-MCNC: 30 MG/DL (ref 0–100)
LDLC/HDLC SERPL: 0.87 {RATIO}
LYMPHOCYTES # BLD AUTO: 1.16 10*3/MM3 (ref 0.7–3.1)
LYMPHOCYTES NFR BLD AUTO: 9.9 % (ref 19.6–45.3)
MAGNESIUM SERPL-MCNC: 2.6 MG/DL (ref 1.6–2.4)
MCH RBC QN AUTO: 28.8 PG (ref 26.6–33)
MCHC RBC AUTO-ENTMCNC: 34.3 G/DL (ref 31.5–35.7)
MCV RBC AUTO: 84 FL (ref 79–97)
METHGB BLD QL: 0.1 % (ref 0–1.5)
METHGB BLD QL: 0.1 % (ref 0–1.5)
MODALITY: ABNORMAL
MODALITY: ABNORMAL
MONOCYTES # BLD AUTO: 1.38 10*3/MM3 (ref 0.1–0.9)
MONOCYTES NFR BLD AUTO: 11.8 % (ref 5–12)
NEUTROPHILS NFR BLD AUTO: 77.4 % (ref 42.7–76)
NEUTROPHILS NFR BLD AUTO: 9.07 10*3/MM3 (ref 1.7–7)
NOTE: ABNORMAL
NOTE: ABNORMAL
NRBC BLD AUTO-RTO: 0.2 /100 WBC (ref 0–0.2)
OXYHGB MFR BLDV: 45.4 % (ref 94–99)
OXYHGB MFR BLDV: 53 % (ref 94–99)
PCO2 BLDV: 45.1 MM HG (ref 41–51)
PCO2 BLDV: 48.9 MM HG (ref 41–51)
PH BLDV: 7.3 PH UNITS (ref 7.31–7.41)
PH BLDV: 7.31 PH UNITS (ref 7.31–7.41)
PHOSPHATE SERPL-MCNC: 6.7 MG/DL (ref 2.5–4.5)
PLATELET # BLD AUTO: 227 10*3/MM3 (ref 140–450)
PMV BLD AUTO: 9.2 FL (ref 6–12)
PO2 BLDV: <40.5 MM HG (ref 35–42)
PO2 BLDV: <40.5 MM HG (ref 35–42)
POTASSIUM SERPL-SCNC: 4.4 MMOL/L (ref 3.5–5.2)
PROT SERPL-MCNC: 6.5 G/DL (ref 6–8.5)
PROTHROMBIN TIME: 27.8 SECONDS (ref 11.8–14.9)
RBC # BLD AUTO: 5.18 10*6/MM3 (ref 4.14–5.8)
SAO2 % BLDCOV: 45.6 % (ref 45–75)
SAO2 % BLDCOV: 53.1 % (ref 45–75)
SODIUM SERPL-SCNC: 135 MMOL/L (ref 136–145)
TRIGL SERPL-MCNC: 69 MG/DL (ref 0–150)
VLDLC SERPL-MCNC: 15 MG/DL (ref 5–40)
WBC NRBC COR # BLD: 11.71 10*3/MM3 (ref 3.4–10.8)

## 2023-08-06 PROCEDURE — 63710000001 INSULIN LISPRO (HUMAN) PER 5 UNITS: Performed by: HOSPITALIST

## 2023-08-06 PROCEDURE — 5A0221D ASSISTANCE WITH CARDIAC OUTPUT USING IMPELLER PUMP, CONTINUOUS: ICD-10-PCS | Performed by: INTERNAL MEDICINE

## 2023-08-06 PROCEDURE — 82948 REAGENT STRIP/BLOOD GLUCOSE: CPT

## 2023-08-06 PROCEDURE — 82805 BLOOD GASES W/O2 SATURATION: CPT | Performed by: INTERNAL MEDICINE

## 2023-08-06 PROCEDURE — 85025 COMPLETE CBC W/AUTO DIFF WBC: CPT | Performed by: INTERNAL MEDICINE

## 2023-08-06 PROCEDURE — 84100 ASSAY OF PHOSPHORUS: CPT | Performed by: INTERNAL MEDICINE

## 2023-08-06 PROCEDURE — 99291 CRITICAL CARE FIRST HOUR: CPT | Performed by: INTERNAL MEDICINE

## 2023-08-06 PROCEDURE — 80061 LIPID PANEL: CPT | Performed by: INTERNAL MEDICINE

## 2023-08-06 PROCEDURE — C1769 GUIDE WIRE: HCPCS | Performed by: INTERNAL MEDICINE

## 2023-08-06 PROCEDURE — 99152 MOD SED SAME PHYS/QHP 5/>YRS: CPT | Performed by: INTERNAL MEDICINE

## 2023-08-06 PROCEDURE — 94664 DEMO&/EVAL PT USE INHALER: CPT

## 2023-08-06 PROCEDURE — 83605 ASSAY OF LACTIC ACID: CPT | Performed by: INTERNAL MEDICINE

## 2023-08-06 PROCEDURE — 25010000002 METHYLPREDNISOLONE PER 125 MG: Performed by: FAMILY MEDICINE

## 2023-08-06 PROCEDURE — 83605 ASSAY OF LACTIC ACID: CPT | Performed by: NURSE PRACTITIONER

## 2023-08-06 PROCEDURE — 0BH17EZ INSERTION OF ENDOTRACHEAL AIRWAY INTO TRACHEA, VIA NATURAL OR ARTIFICIAL OPENING: ICD-10-PCS | Performed by: INTERNAL MEDICINE

## 2023-08-06 PROCEDURE — 33990 INSJ PERQ VAD L HRT ARTERIAL: CPT | Performed by: INTERNAL MEDICINE

## 2023-08-06 PROCEDURE — 99292 CRITICAL CARE ADDL 30 MIN: CPT | Performed by: INTERNAL MEDICINE

## 2023-08-06 PROCEDURE — 93460 R&L HRT ART/VENTRICLE ANGIO: CPT | Performed by: INTERNAL MEDICINE

## 2023-08-06 PROCEDURE — 83735 ASSAY OF MAGNESIUM: CPT | Performed by: INTERNAL MEDICINE

## 2023-08-06 PROCEDURE — 63710000001 LEVALBUTEROL PER 0.5 MG: Performed by: FAMILY MEDICINE

## 2023-08-06 PROCEDURE — 94799 UNLISTED PULMONARY SVC/PX: CPT

## 2023-08-06 PROCEDURE — 25010000002 FUROSEMIDE PER 20 MG: Performed by: INTERNAL MEDICINE

## 2023-08-06 PROCEDURE — C1894 INTRO/SHEATH, NON-LASER: HCPCS | Performed by: INTERNAL MEDICINE

## 2023-08-06 PROCEDURE — B2111ZZ FLUOROSCOPY OF MULTIPLE CORONARY ARTERIES USING LOW OSMOLAR CONTRAST: ICD-10-PCS | Performed by: INTERNAL MEDICINE

## 2023-08-06 PROCEDURE — 80048 BASIC METABOLIC PNL TOTAL CA: CPT | Performed by: INTERNAL MEDICINE

## 2023-08-06 PROCEDURE — 82820 HEMOGLOBIN-OXYGEN AFFINITY: CPT | Performed by: INTERNAL MEDICINE

## 2023-08-06 PROCEDURE — 80076 HEPATIC FUNCTION PANEL: CPT | Performed by: NURSE PRACTITIONER

## 2023-08-06 PROCEDURE — 85610 PROTHROMBIN TIME: CPT | Performed by: NURSE PRACTITIONER

## 2023-08-06 PROCEDURE — 02HA3RZ INSERTION OF SHORT-TERM EXTERNAL HEART ASSIST SYSTEM INTO HEART, PERCUTANEOUS APPROACH: ICD-10-PCS | Performed by: INTERNAL MEDICINE

## 2023-08-06 PROCEDURE — 25010000002 MIDAZOLAM PER 1MG: Performed by: INTERNAL MEDICINE

## 2023-08-06 PROCEDURE — 25010000002 AMIODARONE IN DEXTROSE 5% 360-4.14 MG/200ML-% SOLUTION: Performed by: INTERNAL MEDICINE

## 2023-08-06 PROCEDURE — 25510000001 IOPAMIDOL PER 1 ML: Performed by: INTERNAL MEDICINE

## 2023-08-06 PROCEDURE — 99153 MOD SED SAME PHYS/QHP EA: CPT | Performed by: INTERNAL MEDICINE

## 2023-08-06 PROCEDURE — 31500 INSERT EMERGENCY AIRWAY: CPT | Performed by: INTERNAL MEDICINE

## 2023-08-06 PROCEDURE — C1760 CLOSURE DEV, VASC: HCPCS | Performed by: INTERNAL MEDICINE

## 2023-08-06 PROCEDURE — 94761 N-INVAS EAR/PLS OXIMETRY MLT: CPT

## 2023-08-06 PROCEDURE — 4A023N8 MEASUREMENT OF CARDIAC SAMPLING AND PRESSURE, BILATERAL, PERCUTANEOUS APPROACH: ICD-10-PCS | Performed by: INTERNAL MEDICINE

## 2023-08-06 PROCEDURE — 63710000001 INSULIN DETEMIR PER 5 UNITS: Performed by: INTERNAL MEDICINE

## 2023-08-06 PROCEDURE — 25010000002 HEPARIN (PORCINE) PER 1000 UNITS: Performed by: INTERNAL MEDICINE

## 2023-08-06 PROCEDURE — 5A1935Z RESPIRATORY VENTILATION, LESS THAN 24 CONSECUTIVE HOURS: ICD-10-PCS | Performed by: INTERNAL MEDICINE

## 2023-08-06 PROCEDURE — 85347 COAGULATION TIME ACTIVATED: CPT

## 2023-08-06 PROCEDURE — 25010000002 FENTANYL CITRATE (PF) 100 MCG/2ML SOLUTION: Performed by: INTERNAL MEDICINE

## 2023-08-06 RX ORDER — METOPROLOL SUCCINATE 25 MG/1
25 TABLET, EXTENDED RELEASE ORAL EVERY 12 HOURS SCHEDULED
Start: 2023-08-06

## 2023-08-06 RX ORDER — NOREPINEPHRINE BITARTRATE 0.03 MG/ML
INJECTION, SOLUTION INTRAVENOUS
Status: COMPLETED | OUTPATIENT
Start: 2023-08-06 | End: 2023-08-06

## 2023-08-06 RX ORDER — LACTULOSE 10 G/15ML
10 SOLUTION ORAL 3 TIMES DAILY
Status: DISCONTINUED | OUTPATIENT
Start: 2023-08-06 | End: 2023-08-06 | Stop reason: HOSPADM

## 2023-08-06 RX ORDER — LEVALBUTEROL INHALATION SOLUTION 0.63 MG/3ML
0.63 SOLUTION RESPIRATORY (INHALATION) EVERY 6 HOURS
Status: DISCONTINUED | OUTPATIENT
Start: 2023-08-06 | End: 2023-08-06 | Stop reason: HOSPADM

## 2023-08-06 RX ORDER — MIDAZOLAM HYDROCHLORIDE 2 MG/2ML
INJECTION, SOLUTION INTRAMUSCULAR; INTRAVENOUS
Status: DISCONTINUED | OUTPATIENT
Start: 2023-08-06 | End: 2023-08-06 | Stop reason: HOSPADM

## 2023-08-06 RX ORDER — FENTANYL CITRATE 50 UG/ML
INJECTION, SOLUTION INTRAMUSCULAR; INTRAVENOUS
Status: DISCONTINUED | OUTPATIENT
Start: 2023-08-06 | End: 2023-08-06 | Stop reason: HOSPADM

## 2023-08-06 RX ORDER — MIDODRINE HYDROCHLORIDE 10 MG/1
10 TABLET ORAL
Start: 2023-08-06

## 2023-08-06 RX ORDER — METHYLPREDNISOLONE SODIUM SUCCINATE 125 MG/2ML
125 INJECTION, POWDER, LYOPHILIZED, FOR SOLUTION INTRAMUSCULAR; INTRAVENOUS ONCE
Status: COMPLETED | OUTPATIENT
Start: 2023-08-06 | End: 2023-08-06

## 2023-08-06 RX ORDER — DEXMEDETOMIDINE HYDROCHLORIDE 4 UG/ML
INJECTION, SOLUTION INTRAVENOUS
Status: COMPLETED | OUTPATIENT
Start: 2023-08-06 | End: 2023-08-06

## 2023-08-06 RX ORDER — DEXMEDETOMIDINE HYDROCHLORIDE 4 UG/ML
.2-1.5 INJECTION, SOLUTION INTRAVENOUS
Status: DISCONTINUED | OUTPATIENT
Start: 2023-08-06 | End: 2023-08-06 | Stop reason: HOSPADM

## 2023-08-06 RX ORDER — FUROSEMIDE 10 MG/ML
40 INJECTION INTRAMUSCULAR; INTRAVENOUS EVERY 8 HOURS
Status: DISCONTINUED | OUTPATIENT
Start: 2023-08-06 | End: 2023-08-06 | Stop reason: HOSPADM

## 2023-08-06 RX ORDER — WATER 1000 ML/1000ML
INJECTION, SOLUTION INTRAVENOUS
Status: COMPLETED
Start: 2023-08-06 | End: 2023-08-06

## 2023-08-06 RX ORDER — FUROSEMIDE 10 MG/ML
40 INJECTION INTRAMUSCULAR; INTRAVENOUS EVERY 8 HOURS
Start: 2023-08-06

## 2023-08-06 RX ORDER — PHENYLEPHRINE HCL IN 0.9% NACL 1 MG/10 ML
SYRINGE (ML) INTRAVENOUS
Status: DISCONTINUED | OUTPATIENT
Start: 2023-08-06 | End: 2023-08-06 | Stop reason: HOSPADM

## 2023-08-06 RX ORDER — HEPARIN SODIUM 1000 [USP'U]/ML
INJECTION, SOLUTION INTRAVENOUS; SUBCUTANEOUS
Status: DISCONTINUED | OUTPATIENT
Start: 2023-08-06 | End: 2023-08-06 | Stop reason: HOSPADM

## 2023-08-06 RX ORDER — LIDOCAINE HYDROCHLORIDE 20 MG/ML
INJECTION, SOLUTION INFILTRATION; PERINEURAL
Status: DISCONTINUED | OUTPATIENT
Start: 2023-08-06 | End: 2023-08-06 | Stop reason: HOSPADM

## 2023-08-06 RX ORDER — SIMETHICONE 80 MG
80 TABLET,CHEWABLE ORAL
Status: DISCONTINUED | OUTPATIENT
Start: 2023-08-06 | End: 2023-08-06 | Stop reason: HOSPADM

## 2023-08-06 RX ADMIN — APIXABAN 5 MG: 5 TABLET, FILM COATED ORAL at 09:05

## 2023-08-06 RX ADMIN — Medication 10 ML: at 09:06

## 2023-08-06 RX ADMIN — BUDESONIDE 0.5 MG: 0.5 SUSPENSION RESPIRATORY (INHALATION) at 06:43

## 2023-08-06 RX ADMIN — AMIODARONE HYDROCHLORIDE 0.5 MG/MIN: 1.8 INJECTION, SOLUTION INTRAVENOUS at 11:37

## 2023-08-06 RX ADMIN — INSULIN LISPRO 4 UNITS: 100 INJECTION, SOLUTION INTRAVENOUS; SUBCUTANEOUS at 09:05

## 2023-08-06 RX ADMIN — CETIRIZINE HYDROCHLORIDE 10 MG: 10 TABLET, FILM COATED ORAL at 09:05

## 2023-08-06 RX ADMIN — MIDODRINE HYDROCHLORIDE 10 MG: 10 TABLET ORAL at 09:05

## 2023-08-06 RX ADMIN — METHYLPREDNISOLONE SODIUM SUCCINATE 125 MG: 125 INJECTION INTRAMUSCULAR; INTRAVENOUS at 00:41

## 2023-08-06 RX ADMIN — INSULIN DETEMIR 15 UNITS: 100 INJECTION, SOLUTION SUBCUTANEOUS at 09:04

## 2023-08-06 RX ADMIN — FUROSEMIDE 40 MG: 10 INJECTION, SOLUTION INTRAMUSCULAR; INTRAVENOUS at 11:44

## 2023-08-06 RX ADMIN — ARFORMOTEROL TARTRATE 15 MCG: 15 SOLUTION RESPIRATORY (INHALATION) at 06:43

## 2023-08-06 RX ADMIN — LEVALBUTEROL HYDROCHLORIDE 0.63 MG: 0.63 SOLUTION RESPIRATORY (INHALATION) at 00:36

## 2023-08-06 RX ADMIN — WATER 10 ML: 1 INJECTION INTRAMUSCULAR; INTRAVENOUS; SUBCUTANEOUS at 00:41

## 2023-08-06 RX ADMIN — KETOTIFEN FUMARATE 1 DROP: 0.25 SOLUTION/ DROPS OPHTHALMIC at 09:05

## 2023-08-06 RX ADMIN — FUROSEMIDE 40 MG: 10 INJECTION, SOLUTION INTRAMUSCULAR; INTRAVENOUS at 02:50

## 2023-08-06 RX ADMIN — LEVALBUTEROL HYDROCHLORIDE 0.63 MG: 0.63 SOLUTION RESPIRATORY (INHALATION) at 06:43

## 2023-08-06 RX ADMIN — METOPROLOL SUCCINATE 25 MG: 25 TABLET, EXTENDED RELEASE ORAL at 09:05

## 2023-08-06 NOTE — CONSULTS
Patient Care Team:  Torres Acevedo MD as PCP - General (Internal Medicine)    Chief complaint: LUZMARIA/CKD    Subjective     History of Present Illness  76yo presented to ER with increased edema and orthopnea a few days ago.  He was found to have afib with RVR and started on cardizem gtt.  He has had recurrent hypotension and moved to ICU.  He has had worsened creatinine and we were asked to see him.        Review of Systems   Constitutional:  Positive for fatigue. Negative for fever.   Respiratory:  Positive for shortness of breath. Negative for cough.    Gastrointestinal:  Positive for nausea and vomiting. Negative for diarrhea.   Genitourinary:  Negative for dysuria.   Musculoskeletal:  Negative for back pain.   Neurological:  Negative for headaches.      Past Medical History:   Diagnosis Date    Asthma, intrinsic     CHF (congestive heart failure)     COPD (chronic obstructive pulmonary disease)     PTSD (post-traumatic stress disorder)     Renal stone    ,   Past Surgical History:   Procedure Laterality Date    APPENDECTOMY      ORTHOPEDIC SURGERY Right     ARM    TRIGGER FINGER RELEASE Left    ,   Family History   Family history unknown: Yes   ,   Social History     Socioeconomic History    Marital status:    Tobacco Use    Smoking status: Never     Passive exposure: Never    Smokeless tobacco: Never   Vaping Use    Vaping Use: Never used   Substance and Sexual Activity    Alcohol use: Not Currently    Drug use: Never    Sexual activity: Defer     E-cigarette/Vaping    E-cigarette/Vaping Use Never User      E-cigarette/Vaping Substances    Nicotine No     THC No     CBD No     Flavoring No      E-cigarette/Vaping Devices    Disposable No     Pre-filled or Refillable Cartridge No     Refillable Tank No     Pre-filled Pod No          ,   Medications Prior to Admission   Medication Sig Dispense Refill Last Dose    albuterol sulfate  (90 Base) MCG/ACT inhaler Inhale 2 puffs Every 4 (Four) Hours As  Needed for Wheezing.       arformoterol (Brovana) 15 MCG/2ML nebulizer solution Take 2 mL by nebulization 2 (Two) Times a Day. 120 mL 11 8/2/2023    atorvastatin (LIPITOR) 40 MG tablet Take 1 tablet by mouth Daily.   8/2/2023    budesonide (Pulmicort) 0.5 MG/2ML nebulizer solution Take 2 mL by nebulization Daily. 120 mL 11 8/2/2023    cetirizine (zyrTEC) 10 MG tablet Take 1 tablet by mouth Daily.   8/2/2023    donepezil (ARICEPT) 5 MG tablet Take 1 tablet by mouth Every Night.   8/2/2023    FLUoxetine (PROzac) 10 MG capsule Take 1 capsule by mouth Every Morning.   8/2/2023    FLUoxetine (PROzac) 20 MG capsule Take 1 capsule by mouth Every Morning.   8/2/2023    furosemide (LASIX) 20 MG tablet Take 1 tablet by mouth Daily.   8/2/2023    hydroCHLOROthiazide (HYDRODIURIL) 25 MG tablet Take 1 tablet by mouth Daily.   8/2/2023    insulin glargine (LANTUS, SEMGLEE) 100 UNIT/ML injection Inject 35 Units under the skin into the appropriate area as directed Every Night.   8/2/2023    ipratropium-albuterol (DUO-NEB) 0.5-2.5 mg/3 ml nebulizer Take 3 mL by nebulization Every 4 (Four) Hours As Needed for Wheezing.       ketotifen (ZADITOR) 0.025 % ophthalmic solution Administer 1 drop to both eyes 2 (Two) Times a Day.   8/2/2023    metFORMIN (GLUCOPHAGE) 1000 MG tablet Take 1 tablet by mouth 2 (Two) Times a Day With Meals.   8/2/2023    potassium chloride (K-DUR,KLOR-CON) 10 MEQ CR tablet Take 1 tablet by mouth Daily.   8/2/2023    revefenacin (Yupelri) 175 MCG/3ML nebulizer solution Take 3 mL by nebulization Daily. 90 mL 11 8/2/2023    tiotropium bromide-olodaterol (STIOLTO RESPIMAT) 2.5-2.5 MCG/ACT aerosol solution inhaler Inhale 2 puffs Daily.   8/2/2023    valsartan (DIOVAN) 160 MG tablet Take 1 tablet by mouth Daily.   8/2/2023    Accu-Chek Softclix Lancets lancets by Other route. Use as instructed      , Scheduled Meds:  apixaban, 5 mg, Oral, Q12H  arformoterol, 15 mcg, Nebulization, BID - RT  atorvastatin, 40 mg, Oral,  Nightly  budesonide, 0.5 mg, Nebulization, Daily - RT  cetirizine, 10 mg, Oral, Daily  donepezil, 5 mg, Oral, Nightly  furosemide, 40 mg, Intravenous, Q12H  insulin detemir, 15 Units, Subcutaneous, Q12H  insulin lispro, 4-24 Units, Subcutaneous, 4x Daily AC & at Bedtime  ketotifen, 1 drop, Both Eyes, BID  levalbuterol, 0.63 mg, Nebulization, Q6H  metoprolol succinate XL, 25 mg, Oral, Q12H  midodrine, 10 mg, Oral, TID AC  senna-docusate sodium, 2 tablet, Oral, BID  sodium chloride, 10 mL, Intravenous, Q12H   , Continuous Infusions:  amiodarone, 0.5 mg/min, Last Rate: 0.5 mg/min (08/05/23 2326)  norepinephrine, 0.02-0.3 mcg/kg/min   , PRN Meds:    acetaminophen    aluminum-magnesium hydroxide-simethicone    senna-docusate sodium **AND** [DISCONTINUED] polyethylene glycol **AND** bisacodyl **AND** bisacodyl    dextrose    dextrose    Diclofenac Sodium    glucagon (human recombinant)    hydrOXYzine    lidocaine    melatonin    nicotine    ondansetron    polyethylene glycol    sodium chloride    sodium chloride    sodium chloride, and Allergies:  Patient has no known allergies.    Objective     Vital Signs  Temp:  [96.1 øF (35.6 øC)-97.9 øF (36.6 øC)] 97.9 øF (36.6 øC)  Heart Rate:  [] 83  Resp:  [16-26] 20  BP: ()/() 87/67    No intake/output data recorded.  I/O last 3 completed shifts:  In: 708 [P.O.:708]  Out: 651 [Urine:651]    Physical Exam  Constitutional:       Appearance: Normal appearance.   HENT:      Nose: Nose normal.      Mouth/Throat:      Mouth: Mucous membranes are moist.   Eyes:      General: No scleral icterus.     Pupils: Pupils are equal, round, and reactive to light.   Cardiovascular:      Rate and Rhythm: Tachycardia present. Rhythm irregular.   Pulmonary:      Effort: Pulmonary effort is normal.      Breath sounds: Normal breath sounds.   Abdominal:      General: Abdomen is flat.      Palpations: Abdomen is soft.   Musculoskeletal:      Cervical back: Normal range of motion and  neck supple.      Right lower leg: Edema present.      Left lower leg: Edema present.   Neurological:      General: No focal deficit present.      Mental Status: He is alert.       Results Review:    I reviewed the patient's new clinical results.    WBC WBC   Date Value Ref Range Status   08/06/2023 11.71 (H) 3.40 - 10.80 10*3/mm3 Final   08/05/2023 12.73 (H) 3.40 - 10.80 10*3/mm3 Final   08/04/2023 7.28 3.40 - 10.80 10*3/mm3 Final   08/03/2023 7.92 3.40 - 10.80 10*3/mm3 Final      HGB Hemoglobin   Date Value Ref Range Status   08/06/2023 14.9 13.0 - 17.7 g/dL Final   08/05/2023 13.7 13.0 - 17.7 g/dL Final   08/04/2023 13.4 13.0 - 17.7 g/dL Final   08/03/2023 14.5 13.0 - 17.7 g/dL Final      HCT Hematocrit   Date Value Ref Range Status   08/06/2023 43.5 37.5 - 51.0 % Final   08/05/2023 40.6 37.5 - 51.0 % Final   08/04/2023 40.7 37.5 - 51.0 % Final   08/03/2023 43.8 37.5 - 51.0 % Final      Platlets No results found for: LABPLAT   MCV MCV   Date Value Ref Range Status   08/06/2023 84.0 79.0 - 97.0 fL Final   08/05/2023 85.5 79.0 - 97.0 fL Final   08/04/2023 86.6 79.0 - 97.0 fL Final   08/03/2023 86.6 79.0 - 97.0 fL Final          Sodium Sodium   Date Value Ref Range Status   08/06/2023 135 (L) 136 - 145 mmol/L Final   08/05/2023 135 (L) 136 - 145 mmol/L Final   08/04/2023 138 136 - 145 mmol/L Final   08/03/2023 140 136 - 145 mmol/L Final     Sodium, Arterial   Date Value Ref Range Status   08/05/2023 135.1 (L) 136 - 146 mmol/L Final      Potassium Potassium   Date Value Ref Range Status   08/06/2023 4.4 3.5 - 5.2 mmol/L Final   08/05/2023 4.2 3.5 - 5.2 mmol/L Final   08/04/2023 3.8 3.5 - 5.2 mmol/L Final   08/03/2023 4.1 3.5 - 5.2 mmol/L Final      Chloride Chloride   Date Value Ref Range Status   08/06/2023 98 98 - 107 mmol/L Final   08/05/2023 100 98 - 107 mmol/L Final   08/04/2023 104 98 - 107 mmol/L Final   08/03/2023 102 98 - 107 mmol/L Final      CO2 CO2   Date Value Ref Range Status   08/06/2023 19.6 (L) 22.0  - 29.0 mmol/L Final   08/05/2023 23.5 22.0 - 29.0 mmol/L Final   08/04/2023 22.1 22.0 - 29.0 mmol/L Final   08/03/2023 25.5 22.0 - 29.0 mmol/L Final      BUN BUN   Date Value Ref Range Status   08/06/2023 58 (H) 8 - 23 mg/dL Final   08/05/2023 42 (H) 8 - 23 mg/dL Final   08/04/2023 28 (H) 8 - 23 mg/dL Final   08/03/2023 28 (H) 8 - 23 mg/dL Final      Creatinine Creatinine   Date Value Ref Range Status   08/06/2023 2.56 (H) 0.76 - 1.27 mg/dL Final   08/05/2023 1.83 (H) 0.76 - 1.27 mg/dL Final   08/04/2023 1.44 (H) 0.76 - 1.27 mg/dL Final   08/03/2023 1.65 (H) 0.76 - 1.27 mg/dL Final      Calcium Calcium   Date Value Ref Range Status   08/06/2023 9.6 8.6 - 10.5 mg/dL Final   08/05/2023 9.5 8.6 - 10.5 mg/dL Final   08/04/2023 9.3 8.6 - 10.5 mg/dL Final   08/03/2023 9.7 8.6 - 10.5 mg/dL Final      PO4 No results found for: CAPO4   Albumin Albumin   Date Value Ref Range Status   08/03/2023 4.3 3.5 - 5.2 g/dL Final      Magnesium Magnesium   Date Value Ref Range Status   08/06/2023 2.6 (H) 1.6 - 2.4 mg/dL Final   08/05/2023 2.6 (H) 1.6 - 2.4 mg/dL Final   08/04/2023 2.3 1.6 - 2.4 mg/dL Final   08/03/2023 2.1 1.6 - 2.4 mg/dL Final      Uric Acid No results found for: URICACID         Assessment & Plan       Atrial flutter    SOB (shortness of breath)    Volume overload      Assessment & Plan  LUZMARIA-  He had previous baseline creatinine of 1.2 from 3/23. Non-oliguric at this time with lasix, 1.2L recorded yesterday. Creatinine at 1.65 on presentation, trending up now.  Appears due to hypotension with ATN likely due to low cardiac output.  Renal USN with no hydronephrosis.  UA benign without blood or protein.  Urine sodium <20.  I d/w him dialysis in future if renal function were to worsen and he would precede with this if needed.  No acute indication for dialysis at this time.  Met Acidosis-  worsened lactate with hypotension.  Hypotension-   on midodrine, levo ordered if needed.  Afib-  on amio  CHF-  cxr with no acute  infiltrate.  CT chest ordered.  EF at 21-25% with cardiology following.  On lasix 40mg iv q12 now.  Will increase to q8hrs.  DMII -  Avoid metformin.      I discussed the patients findings and my recommendations with patient    Kp Harrell MD  08/06/23  08:44 EDT

## 2023-08-06 NOTE — DISCHARGE SUMMARY
[facility]         HOSPITALIST  DISCHARGE SUMMARY    Patient Name: Andrew Hart    : 1945    MRN: [id number]    Date of Admission: 8/3/2023  Date of Discharge:  23  Primary Care Physician: Torres Acevedo MD    Consults       Date and Time Order Name Status Description    2023  6:32 PM Inpatient Pulmonology Consult      2023  9:48 AM Inpatient Nephrology Consult      8/3/2023  5:29 PM Cardiology (on-call MD unless specified)      8/3/2023  5:29 PM Hospitalist (on-call MD unless specified)              Final Diagnosis:  Cardiogenic shock  New acute onset systolic heart failure exacerbation  New atrial flutter with RVR  Acute renal failure suspected ATN/cardiorenal  Shock liver  Metabolic acidosis  COPD with exacerbation  NSTEMI type II secondary to volume overload and RVR   DM2  History of dementia, mild  Prolonged QTc on admission      Hospital Course     Hospital Course:  77-year-old male with 2 weeks of worsening shortness of air lower extremity edema presents with negative new onset aflutter with RVR, new onset systolic CHF exacerbation, and copd exacerbation.  Cardiology consulted/assisting.  Nephrology consulted for LUZMARIA which ultimately appears to be related to ATN/cardiorenal/shock.  Patient required transfer to the icu unit, amiodarone drip, was continued to be treated with IV diuresis but had worsening renal function due to poor forward flow.  Underwent [place] catheterization full report pending but based on the cardiology recommended transfer for Impella device this is arranged at [facility].  Patient required intubation for respiratory distress prior to transfer.      CODE STATUS:  Code Status and Medical Interventions:   Ordered at: 23 181     Level Of Support Discussed With:    Patient     Code Status (Patient has no pulse and is not breathing):    CPR (Attempt to Resuscitate)     Medical Interventions (Patient has pulse or is breathing):     Full Support     Release to patient:    Routine Release           Day of Discharge     Vital Signs:  Temp:  [96.1 øF (35.6 øC)-97.9 øF (36.6 øC)] 97.9 øF (36.6 øC)  Heart Rate:  [] 107  Resp:  [16-26] 20  BP: ()/() 107/77  Flow (L/min):  [2-60] 60    Physical Exam  Intubated sedated irregularly irregular elevated rate, lower extremity edema, bilateral rhonchi, crackles in lower lung fields, and soft nontender nondistended     Discharge Details        Discharge Medications        New Medications        Instructions Start Date   amiodarone in dextrose 5% infusion  Commonly known as: NEXTERONE   0.5 mg/min (0.5 mg/min), Intravenous, Continuous      apixaban 5 MG tablet tablet  Commonly known as: ELIQUIS   5 mg, Oral, Every 12 Hours Scheduled      furosemide 10 MG/ML injection  Commonly known as: LASIX  Replaces: furosemide 20 MG tablet   40 mg, Intravenous, Every 8 Hours      metoprolol succinate XL 25 MG 24 hr tablet  Commonly known as: TOPROL-XL   25 mg, Oral, Every 12 Hours Scheduled      midodrine 10 MG tablet  Commonly known as: PROAMATINE   10 mg, Oral, 3 Times Daily Before Meals             Changes to Medications        Instructions Start Date   FLUoxetine 20 MG capsule  Commonly known as: PROzac  What changed: Another medication with the same name was removed. Continue taking this medication, and follow the directions you see here.   20 mg, Oral, Every Morning             Continue These Medications        Instructions Start Date   Accu-Chek Softclix Lancets lancets   Other, Use as instructed      albuterol sulfate  (90 Base) MCG/ACT inhaler  Commonly known as: PROVENTIL HFA;VENTOLIN HFA;PROAIR HFA   2 puffs, Inhalation, Every 4 Hours PRN      arformoterol 15 MCG/2ML nebulizer solution  Commonly known as: Brovana   15 mcg, Nebulization, 2 Times Daily - RT      atorvastatin 40 MG tablet  Commonly known as: LIPITOR   40 mg, Oral, Daily      budesonide 0.5 MG/2ML nebulizer  solution  Commonly known as: Pulmicort   0.5 mg, Nebulization, Daily - RT      cetirizine 10 MG tablet  Commonly known as: zyrTEC   10 mg, Oral, Daily      donepezil 5 MG tablet  Commonly known as: ARICEPT   5 mg, Oral, Nightly      insulin glargine 100 UNIT/ML injection  Commonly known as: LANTUS, SEMGLEE   35 Units, Subcutaneous, Nightly      ipratropium-albuterol 0.5-2.5 mg/3 ml nebulizer  Commonly known as: DUO-NEB   3 mL, Nebulization, Every 4 Hours PRN      ketotifen 0.025 % ophthalmic solution  Commonly known as: ZADITOR   1 drop, Both Eyes, 2 Times Daily      tiotropium bromide-olodaterol 2.5-2.5 MCG/ACT aerosol solution inhaler  Commonly known as: STIOLTO RESPIMAT   2 puffs, Inhalation, Daily - RT      Yupelri 175 MCG/3ML nebulizer solution  Generic drug: revefenacin   175 mcg, Nebulization, Daily - RT             Stop These Medications      furosemide 20 MG tablet  Commonly known as: LASIX  Replaced by: furosemide 10 MG/ML injection     hydroCHLOROthiazide 25 MG tablet  Commonly known as: HYDRODIURIL     metFORMIN 1000 MG tablet  Commonly known as: GLUCOPHAGE     potassium chloride 10 MEQ CR tablet  Commonly known as: K-DURKLOR-CON     valsartan 160 MG tablet  Commonly known as: DIOVAN                Discharge Disposition:  Hospice/Medical Facility (DC - External)    Diet: patient counseled on dietary changes made during hospital and plans to  advance as tolerated     Discharge Activity: advance as tolerated          Pertinent  and/or Most Recent Results       LAB RESULTS:      Lab 08/06/23  1204 08/06/23  0855 08/06/23  0233 08/05/23  2322 08/05/23  2019 08/05/23  1734 08/05/23  0556 08/04/23  0421 08/03/23  1519   WBC  --   --  11.71*  --   --   --  12.73* 7.28 7.92   HEMOGLOBIN  --   --  14.9  --   --   --  13.7 13.4 14.5   HEMATOCRIT  --   --  43.5  --   --   --  40.6 40.7 43.8   PLATELETS  --   --  227  --   --   --  262 226 243   NEUTROS ABS  --   --  9.07*  --   --   --  9.66* 6.31 5.11   IMMATURE  GRANS (ABS)  --   --  0.08*  --   --   --  0.04 0.03 0.02   LYMPHS ABS  --   --  1.16  --   --   --  1.46 0.76 1.93   MONOS ABS  --   --  1.38*  --   --   --  1.55* 0.17 0.80   EOS ABS  --   --  0.00  --   --   --  0.00 0.00 0.01   MCV  --   --  84.0  --   --   --  85.5 86.6 86.6   LACTATE 3.2* 3.1* 2.4* 3.6* 3.1*   < >  --   --   --    LACTATE, ARTERIAL  --   --   --   --   --    < >  --   --   --    PROTIME 27.8*  --   --   --   --   --   --   --   --     < > = values in this interval not displayed.         Lab 08/06/23  0233 08/05/23  1813 08/05/23  0556 08/04/23  0421 08/03/23  1753 08/03/23  1519   SODIUM 135*  --  135* 138  --  140   SODIUM, ARTERIAL  --  135.1*  --   --   --   --    POTASSIUM 4.4  --  4.2 3.8  --  4.1   CHLORIDE 98  --  100 104  --  102   CO2 19.6*  --  23.5 22.1  --  25.5   ANION GAP 17.4*  --  11.5 11.9  --  12.5   BUN 58*  --  42* 28*  --  28*   CREATININE 2.56*  --  1.83* 1.44*  --  1.65*   EGFR 25.4*  --  38.0* 50.7*  --  43.0*   GLUCOSE 131*  --  102* 131*  --  120*   GLUCOSE, ARTERIAL  --  131*  --   --   --   --    CALCIUM 9.6  --  9.5 9.3  --  9.7   IONIZED CALCIUM  --  1.18  --   --   --   --    MAGNESIUM 2.6*  --  2.6* 2.3  --  2.1   PHOSPHORUS 6.7*  --  4.3 4.1  --   --    HEMOGLOBIN A1C  --   --   --   --   --  7.50*   TSH  --   --   --   --  0.387  --          Lab 08/06/23  1204 08/03/23  1519   TOTAL PROTEIN 6.5 7.6   ALBUMIN 3.5 4.3   GLOBULIN  --  3.3   ALT (SGPT) 1,485* 158*   AST (SGOT) 1,637* 78*   BILIRUBIN 0.8 0.7   INDIRECT BILIRUBIN 0.3  --    BILIRUBIN DIRECT 0.5*  --    ALK PHOS 110 123*         Lab 08/06/23  1204 08/03/23  1753 08/03/23  1519   PROBNP  --   --  2,564.0*   HSTROP T  --  60* 67*   PROTIME 27.8*  --   --    INR 2.64*  --   --          Lab 08/06/23  0233   CHOLESTEROL 81   LDL CHOL 30   HDL CHOL 36*   TRIGLYCERIDES 69             Lab 08/05/23  1813   PH, ARTERIAL 7.384   PCO2, ARTERIAL 37.6   PO2 ART 93.7   O2 SATURATION ART 95.9   HCO3 ART 21.9*   BASE  EXCESS ART -2.6*   CARBOXYHEMOGLOBIN 0.6     Brief Urine Lab Results  (Last result in the past 365 days)        Color   Clarity   Blood   Leuk Est   Nitrite   Protein   CREAT   Urine HCG        08/05/23 1325 Yellow   Clear   Negative   Negative   Negative   Negative                 Microbiology Results (last 10 days)       ** No results found for the last 240 hours. **            RADIOLOGY:    XR Chest 1 View    Result Date: 8/5/2023  Impression:  No acute infiltrate is appreciated.  There is stable mild cardiomegaly.    Please note that portions of this note were completed with a voice recognition program.  ANITA CONNORS JR, MD       Electronically Signed and Approved By: ANITA CONNORS JR, MD on 8/05/2023 at 20:58              XR Chest 1 View    Result Date: 8/3/2023  Impression:  No active cardiopulmonary disease       MICHAEL SALGADO MD       Electronically Signed and Approved By: MICHAEL SALGADO MD on 8/03/2023 at 16:04             US Renal Bilateral    Result Date: 8/6/2023  Impression:   No hydronephrosis is seen bilaterally.  There are probably benign renal cysts bilaterally, measuring nearly 6 cm in greatest size, as detailed above.  The exam is limited, as discussed.  Please see above comments for additional findings.     Please note that portions of this note were completed with a voice recognition program.  ANITA CONNORS JR, MD       Electronically Signed and Approved By: ANITA CONNORS JR, MD on 8/06/2023 at 2:35              XR Abdomen KUB    Result Date: 8/5/2023  Impression:  The bowel gas pattern is nonobstructive.  No definite nephrolithiasis or ureterolithiasis is suggested.  Mild cardiomegaly is suspected.     Please note that portions of this note were completed with a voice recognition program.  ANITA CONNORS JR, MD       Electronically Signed and Approved By: ANITA CONNORS JR, MD on 8/05/2023 at 21:07               Results for orders placed during the hospital encounter of 08/03/23    Duplex  Venous Lower Extremity - Bilateral CV-READ    Interpretation Summary    Normal bilateral lower extremity venous duplex scan.      Results for orders placed during the hospital encounter of 08/03/23    Duplex Venous Lower Extremity - Bilateral CV-READ    Interpretation Summary    Normal bilateral lower extremity venous duplex scan.      Results for orders placed during the hospital encounter of 08/03/23    Adult Transthoracic Echo Complete W/ Cont if Necessary Per Protocol    Interpretation Summary    Left ventricular systolic function is severely decreased. Calculated left ventricular EF = 21.2% Left ventricular ejection fraction appears to be 21 - 25%.    Left ventricular diastolic dysfunction is noted.    The right ventricular cavity is dilated.    The left atrial cavity is dilated.    The right atrial cavity is dilated.    Moderate tricuspid valve regurgitation is present.    Estimated right ventricular systolic pressure from tricuspid regurgitation is mildly elevated (35-45 mmHg).      Labs Pending at Discharge:  Pending Labs       Order Current Status    Blood Gas, Venous -With Co-Ox Panel: Yes In process    Blood Gas, Venous -With Co-Ox Panel: Yes In process

## 2023-08-06 NOTE — PROCEDURES
Endotracheal Intubation note    Indication: Worsening respiratory failure, impending respiratory arrest, need of cardiac procedure    Consent obtained: No, emergent    Medications:  Premedication: Versed 4 mg, fentanyl 100 mcg IV  Induction agent: Etomidate 40 mg IV  Paralytics: Rocuronium 50 mg IV    Patient was sedated with sedatives. GlideScope was used to visualize epiglottis and vocal cords. Grade II view. Endotracheal intubation was done on first attempt with size 8 ET tube under direct GlideScope view. Patient tolerated the procedure well.     Complications: None.

## 2023-08-06 NOTE — PROGRESS NOTES
[facility]   Hospitalist Progress Note    Date of admission: 8/3/2023  Patient Name: Andrew Hart  1945  Date: 8/6/2023      Subjective     Chief Complaint   Patient presents with    Shortness of Breath    Edema       Summary: 77-year-old male with 2 weeks of worsening shortness of air lower extremity edema presents with a flutter with RVR, new onset systolic CHF exacerbation.  Cardiology consulted/assisting.  Nephrology consulted for LUZMARIA    Interval Followup: Worsening blood pressure, remains tachycardic, still short of air.  Denies chest pain or pressure.  Pending [place]-Tina evaluation this morning.    Objective     Vitals:   Temp:  [96.1 øF (35.6 øC)-97.9 øF (36.6 øC)] 97.9 øF (36.6 øC)  Heart Rate:  [] 107  Resp:  [16-26] 20  BP: ()/() 107/77  Flow (L/min):  [2-60] 60    Physical Exam  Awake conversant but tired  Conversational dyspnea on Airvo, crackles in lower lung fields  Irregular regular elevated rate lower extremity pitting edema similar  Abdomen soft nontender nondistended  Alert to self basic conversation, poor insight    Result Review:  Vital signs, labs and recent relevant imaging reviewed.        [MAR Hold] acetaminophen    [MAR Hold] aluminum-magnesium hydroxide-simethicone    [MAR Hold] senna-docusate sodium **AND** [DISCONTINUED] polyethylene glycol **AND** [MAR Hold] bisacodyl **AND** [MAR Hold] bisacodyl    [MAR Hold] dextrose    [MAR Hold] dextrose    [MAR Hold] Diclofenac Sodium    [MAR Hold] glucagon (human recombinant)    [MAR Hold] hydrOXYzine    [MAR Hold] lidocaine    lidocaine    [MAR Hold] melatonin    [MAR Hold] nicotine    [MAR Hold] ondansetron    [MAR Hold] polyethylene glycol    [MAR Hold] sodium chloride    [MAR Hold] sodium chloride    [MAR Hold] sodium chloride    [MAR Hold] apixaban, 5 mg, Oral, Q12H  [MAR Hold] arformoterol, 15 mcg, Nebulization, BID - RT  [MAR Hold] atorvastatin, 40 mg, Oral, Nightly  [MAR Hold] budesonide, 0.5 mg, Nebulization,  Daily - RT  [MAR Hold] cetirizine, 10 mg, Oral, Daily  [MAR Hold] donepezil, 5 mg, Oral, Nightly  [MAR Hold] furosemide, 40 mg, Intravenous, Q8H  [MAR Hold] insulin detemir, 15 Units, Subcutaneous, Q12H  [MAR Hold] insulin lispro, 4-24 Units, Subcutaneous, 4x Daily AC & at Bedtime  [MAR Hold] ketotifen, 1 drop, Both Eyes, BID  [MAR Hold] lactulose, 10 g, Oral, TID  [MAR Hold] levalbuterol, 0.63 mg, Nebulization, Q6H  metoprolol succinate XL, 25 mg, Oral, Q12H  [MAR Hold] midodrine, 10 mg, Oral, TID AC  [MAR Hold] senna-docusate sodium, 2 tablet, Oral, BID  [MAR Hold] simethicone, 80 mg, Oral, 4x Daily AC & at Bedtime  [MAR Hold] sodium chloride, 10 mL, Intravenous, Q12H      8/5 KUB nonobstructive bowel gas pattern, mild cardiomegaly noticed    Assessment / Plan     Assessment/Plan:  Cardiogenic shock  New acute onset systolic heart failure exacerbation  New atrial flutter with RVR  Acute renal failure suspected ATN/cardiorenal  Shock liver  Metabolic acidosis  COPD with exacerbation  NSTEMI type II secondary to volume overload and RVR   DM2  History of dementia, mild  Prolonged QTc on admission    Remains intermittently hypotensive, Levophed drip as needed, amiodarone for rate control, continue midodrine  Increase IV Lasix, cardiorenal/poor perfusion related, nephrology assisting appreciate assistance  Discussed with cardiology, [place]-Tina catheter for further evaluation, notified appears to be in cardiogenic shock.  Needing transfer  LFTs notably elevated, congestive hepatopathy/shock liver  Continue inhalers/respite hygiene supplemental oxygen, on Airvo  Discussed with pulmonology, continue treatment and monitoring as above, later requiring intubation following catheterization.  Echo resulting with reduced EF,   Levemir, SSI, monitor closely  Speech therapy evaluation given questionable choking episode, appreciate assistance  PT OT,   Continue to monitor closely while arranging transport.  Check a.m. CBC, BMP,  magnesium, phosphorus, close I&O monitoring, 2 g sodium diet fluid restriction     Cont close inpatient monitoring       DVT prophylaxis:  Medical and mechanical DVT prophylaxis orders are present.    Level Of Support Discussed With: Patient  Code Status (Patient has no pulse and is not breathing): CPR (Attempt to Resuscitate)  Medical Interventions (Patient has pulse or is breathing): Full Support  Release to patient: Routine Release        CBC          8/4/2023    04:21 8/5/2023    05:56 8/6/2023    02:33   CBC   WBC 7.28  12.73  11.71    RBC 4.70  4.75  5.18    Hemoglobin 13.4  13.7  14.9    Hematocrit 40.7  40.6  43.5    MCV 86.6  85.5  84.0    MCH 28.5  28.8  28.8    MCHC 32.9  33.7  34.3    RDW 17.2  17.2  16.7    Platelets 226  262  227      CMP          8/4/2023    04:21 8/5/2023    05:56 8/5/2023    18:13 8/6/2023    02:33 8/6/2023    12:04   CMP   Glucose 131  102  131  131     BUN 28  42   58     Creatinine 1.44  1.83   2.56     EGFR 50.7  38.0   25.4     Sodium 138  135  135.1  135     Potassium 3.8  4.2   4.4     Chloride 104  100   98     Calcium 9.3  9.5   9.6     Total Protein     6.5    Albumin     3.5    Total Bilirubin     0.8    Alkaline Phosphatase     110    AST (SGOT)     1,637    ALT (SGPT)     1,485    BUN/Creatinine Ratio 19.4  23.0   22.7     Anion Gap 11.9  11.5   17.4     Patient is critically ill due to A-fib with RVR, cardiogenic shock, systolic CHF exacerbation, acute renal failure.  I have spent >32 minutes of critical care time reviewing documentation, pertinent labs, imaging studies, examining the patient, modifying care plan, and discussing patient's condition and care plan with the patient, nursing and cardiology nephro and pulm

## 2023-08-06 NOTE — CONSULTS
Pulmonary / Critical Care Consult Note      Patient Name: Andrew Hart  : 1945  MRN: [id number]  Primary Care Physician:  Torres Acevedo MD  Referring Physician: Rick Quick MD  Date of admission: 8/3/2023    Subjective   Subjective     Reason for Consult/ Chief Complaint: Cardiogenic shock, acute renal injury, volume overload    HPI:  Andrew Hart is a 77 y.o. male with history of COPD, asthma, systolic heart failure, LV dysfunction, diabetes, hypertension, and PTSD presented to the ED with complaints of increased swelling to bilateral lower extremities and shortness of breath.  Laboratory findings showed proBNP 2500 , AST 78, creatinine 1.65, lactic acid 2.5.  Echocardiogram revealed further reduced ejection fraction 21.2% from previous in .  Cardiology was consulted, noted to have elevated heart rate, notably atrial flutter with RVR.  He was initially placed on amiodarone for rate control however developed hypotension.  Overnight patient had progressive and worsening hypotension was transferred to the ICU for possible Levophed.  Of note he has also developed acute kidney injury in the setting of hypotension/cardiogenic shock.    Patient did complain of shortness of breath overnight, his resting comfortably on Airvo 30 L 30% FiO2 O2 saturation high 90s.  On exam he is grossly volume overloaded with 2-3+ pitting edema bilateral lower and upper extremities.  Chest x-ray reviewed he is on amiodarone drip, heart rate is 110 A-fib.    Review of Systems  Constitutional symptoms: Fatigue, Nilda  Ear, nose, throat: Denied complaints  Cardiovascular:  Denied complaints  Respiratory: Shortness of breath, dry cough  Gastrointestinal: Denied complaints  Musculoskeletal: Denied complaints  Genitourinary: Denied complaints  Allergy / Immunology: Denied complaints  Hematologic: Denied complaints  Neurologic: Denied complaints  Skin: Denied complaints  Endocrine: Denied complaints  Psychiatric:  Denied complaints      Personal History     Past Medical History:   Diagnosis Date    Asthma, intrinsic     CHF (congestive heart failure)     COPD (chronic obstructive pulmonary disease)     PTSD (post-traumatic stress disorder)     Renal stone        Past Surgical History:   Procedure Laterality Date    APPENDECTOMY      ORTHOPEDIC SURGERY Right     ARM    TRIGGER FINGER RELEASE Left        Family History: Family history is unknown by patient. Otherwise pertinent FHx was reviewed and not pertinent to current issue.    Social History:  reports that he has never smoked. He has never been exposed to tobacco smoke. He has never used smokeless tobacco. He reports that he does not currently use alcohol. He reports that he does not use drugs.    Home Medications:  Accu-Chek Softclix Lancets, FLUoxetine, albuterol sulfate HFA, arformoterol, atorvastatin, budesonide, cetirizine, donepezil, furosemide, hydroCHLOROthiazide, insulin glargine, ipratropium-albuterol, ketotifen, metFORMIN, potassium chloride, revefenacin, tiotropium bromide-olodaterol, and valsartan    Allergies:  No Known Allergies    Objective    Objective     Vitals:   Temp:  [96.1 øF (35.6 øC)-97.9 øF (36.6 øC)] 97.9 øF (36.6 øC)  Heart Rate:  [] 87  Resp:  [16-26] 20  BP: ()/() 87/77  Flow (L/min):  [2-45] 30    Physical Exam:    Vital Signs Reviewed   General: WDWN, Alert, no acute distress male on Airvo  HEENT:  PERRL, EOMI.  OP, nares clear  Neck:  Supple, no JVD, no thyromegaly  Chest:  good aeration, clear to auscultation bilaterally, tympanic to percussion bilaterally, no work of breathing noted  CV: Irregular, A-fib, no MGR, pulses 2+, equal.  Abd:  Soft, NT, ND, + BS, no HSM  EXT:  no clubbing, no cyanosis, diffuse edema 2+ pitting bilateral lower extremities  Clubbing of nailbeds  Neuro:  A&Ox3, CN grossly intact, no focal deficits.  Skin: No rashes or lesions noted      Result Review    Result Review:  I have personally reviewed  the results from the time of this admission to 8/6/2023 11:02 EDT and agree with these findings:  [x]  Laboratory  []  Microbiology  [x]  Radiology  [x]  EKG/Telemetry   [x]  Cardiology/Vascular   []  Pathology  [x]  Old records  [x]  Other:  Most notable findings include:   Venous Doppler negative for DVT 8/5        Lab 08/06/23  0233 08/05/23  1813 08/05/23  0556 08/04/23  0421 08/03/23  1519   WBC 11.71*  --  12.73* 7.28 7.92   HEMOGLOBIN 14.9  --  13.7 13.4 14.5   HEMATOCRIT 43.5  --  40.6 40.7 43.8   PLATELETS 227  --  262 226 243   SODIUM 135*  --  135* 138 140   SODIUM, ARTERIAL  --  135.1*  --   --   --    POTASSIUM 4.4  --  4.2 3.8 4.1   CHLORIDE 98  --  100 104 102   CO2 19.6*  --  23.5 22.1 25.5   BUN 58*  --  42* 28* 28*   CREATININE 2.56*  --  1.83* 1.44* 1.65*   GLUCOSE 131*  --  102* 131* 120*   GLUCOSE, ARTERIAL  --  131*  --   --   --    CALCIUM 9.6  --  9.5 9.3 9.7   PHOSPHORUS 6.7*  --  4.3 4.1  --    TOTAL PROTEIN  --   --   --   --  7.6   ALBUMIN  --   --   --   --  4.3   GLOBULIN  --   --   --   --  3.3     XR Chest 1 View    Result Date: 8/5/2023   No acute infiltrate is appreciated.  There is stable mild cardiomegaly.    Please note that portions of this note were completed with a voice recognition program.  ANITA CONNORS JR, MD       Electronically Signed and Approved By: ANITA CONNORS JR, MD on 8/05/2023 at 20:58              US Renal Bilateral    Result Date: 8/6/2023    No hydronephrosis is seen bilaterally.  There are probably benign renal cysts bilaterally, measuring nearly 6 cm in greatest size, as detailed above.  The exam is limited, as discussed.  Please see above comments for additional findings.     Please note that portions of this note were completed with a voice recognition program.  ANITA CONNORS JR, MD       Electronically Signed and Approved By: ANITA CONNORS JR, MD on 8/06/2023 at 2:35              XR Abdomen KUB    Result Date: 8/5/2023   The bowel gas pattern is  nonobstructive.  No definite nephrolithiasis or ureterolithiasis is suggested.  Mild cardiomegaly is suspected.     Please note that portions of this note were completed with a voice recognition program.  ANITA CONNORS JR, MD       Electronically Signed and Approved By: ANITA CONNORS JR, MD on 8/05/2023 at 21:07                 Assessment & Plan   Assessment / Plan     Active Hospital Problems:  Active Hospital Problems    Diagnosis     **Atrial flutter     Volume overload     Acute HFrEF (heart failure with reduced ejection fraction)     Pulmonary HTN     SOB (shortness of breath)        Impression:  Cardiogenic shock  Acute hypoxic respiratory failure requiring supplemental oxygen  Acute decompensated systolic heart failure with reduced ejection fraction  Acute renal failure, likely ATN related to hypotension, cardiogenic shock  Or fibrillation with RVR  Cardiogenic pulmonary edema  Lactic acidosis, clinically significant  Anion gap metabolic acidosis  COPD without acute exacerbation  Asthma not in acute exacerbation  Hypophosphatemia  Type 2 diabetes  Transaminitis    Plan:  -Continue to monitor in ICU  -Continue supplemental oxygen via Airvo, wean as tolerated  -Continue nebulizers, Brovana Pulmicort and Xopenex  -We will check CT of chest, x-ray not impressive for volume overload  -Patient on amiodarone per cardiology  -Would avoid hypotension in the setting of LUZMARIA  -Okay to use Levophed, or inotrope to maintain MAP greater than 65  Will defer to cardiology  -Continue midodrine  -Continue Eliquis  -Agree with IV diuresis, 40 mg IV every 8 hours  -Neurology is consulted, appreciate assistance  -Continue PATEL/SSI to optimize glucose  -Check hepatic panel, follow LFTs  -Check INR  -Trend lactic acid      Addendum:  Patient was taken to Cath Lab.  Was found to be in cardiogenic shock.  Emergent Impella device placed for hemodynamic support.  Intubated for respiratory distress.  Starting on Levophed drip.  Patient  is planned for transfer to [facility] for management of advanced heart failure and consideration of VAD.   Vent settings: AC VC 20/450/PEEP of 8/FiO2 to keep saturations more than 90%  Start fentanyl and propofol drip  Patient will need urgent transfer.  Discussed with Dr. Quick.  Will need transfer to [facility] for ventricular assist device      DVT prophylaxis:  Medical and mechanical DVT prophylaxis orders are present.     Code Status and Medical Interventions:   Ordered at: 08/03/23 1811     Level Of Support Discussed With:    Patient     Code Status (Patient has no pulse and is not breathing):    CPR (Attempt to Resuscitate)     Medical Interventions (Patient has pulse or is breathing):    Full Support     Release to patient:    Routine Release      I, TONY Nunez, spent 18 minutes critical care time in accordance to split shared billing.    Electronically signed by TONY Ferreira, 08/06/23, 11:02 AM EDT.      The patient is critically ill in the ICU with cardiogenic shock, respiratory failure, transaminitis, shock liver, on pressors. Multidisciplinary bedside critical care rounds were performed with nursing staff, respiratory therapy, pharmacy, nutritional services, social work. I have personally reviewed the chart, labs and any pertinent imaging available.  We have spent 108 minutes of critical care time, excluding procedures, in the care of this patient. I, Dr Ross, spent 90 mins of critical care time according to split shared billing guidelines for critical care.     Electronically signed by Abhishek Ross MD, 08/06/23, 4:36 PM EDT.

## 2023-08-07 LAB — QT INTERVAL: 349 MS

## 2023-08-24 LAB — QT INTERVAL: 328 MS

## 2023-09-12 ENCOUNTER — OFFICE VISIT (OUTPATIENT)
Dept: PULMONOLOGY | Facility: CLINIC | Age: 78
End: 2023-09-12
Payer: OTHER GOVERNMENT

## 2023-09-12 VITALS
WEIGHT: 217.4 LBS | HEART RATE: 73 BPM | HEIGHT: 68 IN | SYSTOLIC BLOOD PRESSURE: 125 MMHG | BODY MASS INDEX: 32.95 KG/M2 | RESPIRATION RATE: 18 BRPM | OXYGEN SATURATION: 97 % | TEMPERATURE: 98.6 F | DIASTOLIC BLOOD PRESSURE: 74 MMHG

## 2023-09-12 DIAGNOSIS — R76.8 ELEVATED IGE LEVEL: ICD-10-CM

## 2023-09-12 DIAGNOSIS — R06.09 DYSPNEA ON EXERTION: ICD-10-CM

## 2023-09-12 DIAGNOSIS — J44.9 CHRONIC OBSTRUCTIVE PULMONARY DISEASE, UNSPECIFIED COPD TYPE: ICD-10-CM

## 2023-09-12 DIAGNOSIS — D72.19 PERIPHERAL EOSINOPHILIA: ICD-10-CM

## 2023-09-12 DIAGNOSIS — I50.21 ACUTE HFREF (HEART FAILURE WITH REDUCED EJECTION FRACTION): ICD-10-CM

## 2023-09-12 DIAGNOSIS — J45.50 SEVERE PERSISTENT ASTHMA WITHOUT COMPLICATION: Primary | ICD-10-CM

## 2023-09-12 DIAGNOSIS — Z23 ENCOUNTER FOR IMMUNIZATION: ICD-10-CM

## 2023-09-12 RX ORDER — AMIODARONE HYDROCHLORIDE 200 MG/1
TABLET ORAL
COMMUNITY
Start: 2023-08-21

## 2023-09-12 RX ORDER — BUMETANIDE 1 MG/1
1 TABLET ORAL DAILY
COMMUNITY

## 2023-09-12 RX ORDER — POTASSIUM CHLORIDE 750 MG/1
10 TABLET, EXTENDED RELEASE ORAL DAILY
COMMUNITY

## 2023-09-12 RX ORDER — PANTOPRAZOLE SODIUM 40 MG/1
TABLET, DELAYED RELEASE ORAL
COMMUNITY
Start: 2023-08-21

## 2023-09-12 NOTE — PROGRESS NOTES
Primary Care Provider  Torres Acevedo MD     Referring Provider  No ref. provider found     Chief Complaint  Asthma, Shortness of Breath, and Follow-up (2 month f/up )    Subjective          Andrew Hart presents to [facility]  History of Present Illness  Andrew Hart is a 77 y.o. male patient of Dr. Silvestre here for management of severe persistent asthma, COPD, peripheral eosinophilia, elevated IgE, shortness of breath and chronic cough and acute systolic congestive heart failure.    Patient states that he is doing significantly better than the last time he was seen in our office.  At that time he was sent to the emergency room and admitted to [facility].  He was then transferred to [facility] to have an Impella device placed.  His echocardiogram from [facility] showed a left ventricular systolic function severely decreased at 21.2%.  He also had left ventricular diastolic dysfunction, a dilated right ventricular cavity, left atrial cavity and right atrial cavity, moderate tricuspid valve regurgitation and a mildly elevated right ventricular systolic pressure.  Patient also had a significant amount of lower extremity edema during his last office visit.  This has resolved.  Patient states that he is back to almost baseline.  He continues to use Brovana, Pulmicort and Yupelri as prescribed.  He states that he does not need to use his rescue inhaler or his DuoNebs.  His shortness of breath is mild in severity, worse with exertion and improved with rest.  He is hoping to restart Nucala at this time.  He continues to wear 2 L of oxygen at night with his BiPAP.  Overall, patient states that he is doing much better and has no additional respiratory concerns today.  He is able to perform his ADLs without difficulty.  He is up-to-date with his COVID and would like to receive a flu and pneumonia vaccine today in office.       His history of  smoking is   Tobacco Use: Low Risk     Smoking Tobacco Use: Never    Smokeless Tobacco Use: Never    Passive Exposure: Never   .    Review of Systems   Constitutional:  Negative for chills, fatigue, fever, unexpected weight gain and unexpected weight loss.   HENT:  Negative for congestion (Nasal), hearing loss, mouth sores, nosebleeds, postnasal drip, sore throat and trouble swallowing.    Respiratory:  Positive for shortness of breath. Negative for apnea, cough and wheezing.         Negative for Hemoptysis     Cardiovascular:  Negative for chest pain, palpitations and leg swelling.   Gastrointestinal:  Negative for constipation, diarrhea, nausea, vomiting and GERD.   Skin:  Negative for color change.        Negative for cyanosis   Neurological:  Negative for syncope, weakness, numbness and headache.    Sleep: Negative for Excessive daytime sleepiness  Negative for morning headaches  Negative for Snoring    Family History   Family history unknown: Yes        Social History     Socioeconomic History    Marital status:    Tobacco Use    Smoking status: Never     Passive exposure: Never    Smokeless tobacco: Never   Vaping Use    Vaping Use: Never used   Substance and Sexual Activity    Alcohol use: Not Currently    Drug use: Never    Sexual activity: Defer        Past Medical History:   Diagnosis Date    Asthma, intrinsic     CHF (congestive heart failure)     COPD (chronic obstructive pulmonary disease)     PTSD (post-traumatic stress disorder)     Renal stone         Immunization History   Administered Date(s) Administered    COVID-19 (MODERNA) 1st,2nd,3rd Dose Monovalent 02/22/2021, 03/29/2021, 10/25/2021    COVID-19 (MODERNA) BIVALENT 12+YRS 11/14/2022    Fluzone High Dose =>65 Years ([employer] ONLY) 10/26/2016, 12/31/2022    Fluzone High-Dose 65+yrs 09/12/2023    Pneumococcal Conjugate 13-Valent (PCV13) 10/26/2016    Pneumococcal Conjugate 20-Valent (PCV20) 09/12/2023         No Known Allergies       Current  Outpatient Medications:     Accu-Chek Softclix Lancets lancets, by Other route. Use as instructed, Disp: , Rfl:     albuterol sulfate  (90 Base) MCG/ACT inhaler, Inhale 2 puffs Every 4 (Four) Hours As Needed for Wheezing., Disp: , Rfl:     amiodarone (PACERONE) 200 MG tablet, , Disp: , Rfl:     apixaban (ELIQUIS) 5 MG tablet tablet, Take 1 tablet by mouth Every 12 (Twelve) Hours. Indications: Atrial Fibrillation, Disp: 60 tablet, Rfl:     arformoterol (Brovana) 15 MCG/2ML nebulizer solution, Take 2 mL by nebulization 2 (Two) Times a Day., Disp: 120 mL, Rfl: 11    atorvastatin (LIPITOR) 40 MG tablet, Take 1 tablet by mouth Daily., Disp: , Rfl:     budesonide (Pulmicort) 0.5 MG/2ML nebulizer solution, Take 2 mL by nebulization Daily., Disp: 120 mL, Rfl: 11    bumetanide (BUMEX) 1 MG tablet, Take 1 tablet by mouth Daily., Disp: , Rfl:     insulin glargine (LANTUS, SEMGLEE) 100 UNIT/ML injection, Inject 35 Units under the skin into the appropriate area as directed Every Night., Disp: , Rfl:     ipratropium-albuterol (DUO-NEB) 0.5-2.5 mg/3 ml nebulizer, Take 3 mL by nebulization Every 4 (Four) Hours As Needed for Wheezing., Disp: , Rfl:     pantoprazole (PROTONIX) 40 MG EC tablet, , Disp: , Rfl:     potassium chloride (K-DUR,KLOR-CON) 10 MEQ CR tablet, Take 1 tablet by mouth Daily., Disp: , Rfl:     revefenacin (Yupelri) 175 MCG/3ML nebulizer solution, Take 3 mL by nebulization Daily., Disp: 90 mL, Rfl: 11    amiodarone in dextrose 5% (NEXTERONE) infusion, Infuse 0.5 mg/min into a venous catheter Continuous. (Patient not taking: Reported on 9/12/2023), Disp: , Rfl:     cetirizine (zyrTEC) 10 MG tablet, Take 1 tablet by mouth Daily. (Patient not taking: Reported on 9/12/2023), Disp: , Rfl:     donepezil (ARICEPT) 5 MG tablet, Take 1 tablet by mouth Every Night. (Patient not taking: Reported on 9/12/2023), Disp: , Rfl:     FLUoxetine (PROzac) 20 MG capsule, Take 1 capsule by mouth Every Morning. (Patient not taking:  Reported on 9/12/2023), Disp: , Rfl:     furosemide (LASIX) 10 MG/ML injection, Infuse 4 mL into a venous catheter Every 8 (Eight) Hours. (Patient not taking: Reported on 9/12/2023), Disp: , Rfl:     ketotifen (ZADITOR) 0.025 % ophthalmic solution, Administer 1 drop to both eyes 2 (Two) Times a Day. (Patient not taking: Reported on 9/12/2023), Disp: , Rfl:     metoprolol succinate XL (TOPROL-XL) 25 MG 24 hr tablet, Take 1 tablet by mouth Every 12 (Twelve) Hours. (Patient not taking: Reported on 9/12/2023), Disp: , Rfl:     midodrine (PROAMATINE) 10 MG tablet, Take 1 tablet by mouth 3 (Three) Times a Day Before Meals. (Patient not taking: Reported on 9/12/2023), Disp: , Rfl:     tiotropium bromide-olodaterol (STIOLTO RESPIMAT) 2.5-2.5 MCG/ACT aerosol solution inhaler, Inhale 2 puffs Daily. (Patient not taking: Reported on 9/12/2023), Disp: , Rfl:      Objective   Physical Exam  Constitutional:       General: He is not in acute distress.     Appearance: Normal appearance. He is normal weight.   HENT:      Right Ear: Hearing normal.      Left Ear: Hearing normal.      Nose: No nasal tenderness or congestion.      Mouth/Throat:      Mouth: Mucous membranes are moist. No oral lesions.   Eyes:      Extraocular Movements: Extraocular movements intact.      Pupils: Pupils are equal, round, and reactive to light.   Neck:      Thyroid: No thyroid mass or thyromegaly.   Cardiovascular:      Rate and Rhythm: Normal rate and regular rhythm.      Pulses: Normal pulses.      Heart sounds: Normal heart sounds. No murmur heard.  Pulmonary:      Effort: Pulmonary effort is normal.      Breath sounds: Normal breath sounds. No wheezing, rhonchi or rales.   Musculoskeletal:      Cervical back: Neck supple.      Right lower leg: No edema.      Left lower leg: No edema.   Lymphadenopathy:      Cervical: No cervical adenopathy.      Upper Body:      Right upper body: No axillary adenopathy.   Skin:     General: Skin is warm and dry.       "Findings: No lesion or rash.   Neurological:      General: No focal deficit present.      Mental Status: He is alert and oriented to person, place, and time.   Psychiatric:         Mood and Affect: Affect normal. Mood is not anxious or depressed.       Vital Signs:   /74 (BP Location: Right arm, Patient Position: Sitting, Cuff Size: Large Adult)   Pulse 73   Temp 98.6 °F (37 °C) (Temporal)   Resp 18   Ht 172.7 cm (68\")   Wt 98.6 kg (217 lb 6.4 oz)   SpO2 97% Comment: room air; 2 L's nocturnal  BMI 33.06 kg/m²        Result Review :   The following data was reviewed by: TONY Tillman on 09/12/2023:  CMP          8/4/2023    04:21 8/5/2023    05:56 8/5/2023    18:13 8/6/2023    02:33 8/6/2023    12:04   CMP   Glucose 131  102  131  131     BUN 28  42   58     Creatinine 1.44  1.83   2.56     EGFR 50.7  38.0   25.4     Sodium 138  135  135.1  135     Potassium 3.8  4.2   4.4     Chloride 104  100   98     Calcium 9.3  9.5   9.6     Total Protein     6.5    Albumin     3.5    Total Bilirubin     0.8    Alkaline Phosphatase     110    AST (SGOT)     1,637    ALT (SGPT)     1,485    BUN/Creatinine Ratio 19.4  23.0   22.7     Anion Gap 11.9  11.5   17.4       CBC w/diff          8/4/2023    04:21 8/5/2023    05:56 8/6/2023    02:33   CBC w/Diff   WBC 7.28  12.73  11.71    RBC 4.70  4.75  5.18    Hemoglobin 13.4  13.7  14.9    Hematocrit 40.7  40.6  43.5    MCV 86.6  85.5  84.0    MCH 28.5  28.8  28.8    MCHC 32.9  33.7  34.3    RDW 17.2  17.2  16.7    Platelets 226  262  227    Neutrophil Rel % 86.8  75.8  77.4    Immature Granulocyte Rel % 0.4  0.3  0.7    Lymphocyte Rel % 10.4  11.5  9.9    Monocyte Rel % 2.3  12.2  11.8    Eosinophil Rel % 0.0  0.0  0.0    Basophil Rel % 0.1  0.2  0.2    Personally reviewed IgE level at 98.5 on 3/6/2023    Data reviewed : Radiologic studies chest CT 3/6/2023, chest x-ray 8/5/2023, pulmonary function test 3/6/2023, Cardiology studies echocardiogram 8/4/2023, " Consultant notes Dr. Ross consult note 8/6/2023, Recent hospitalization notes [facility] discharge summary 8/6/2023, and Mariaa JIMENEZ last office note    Procedures        Assessment and Plan    Diagnoses and all orders for this visit:    1. Severe persistent asthma without complication (Primary)    2. Chronic obstructive pulmonary disease, unspecified COPD type    3. Elevated IgE level    4. Encounter for immunization  -     Fluzone High-Dose 65+yrs ([id number])  -     Pneumococcal Conjugate Vaccine 20-Valent (PCV20)    5. Dyspnea on exertion    6. Acute HFrEF (heart failure with reduced ejection fraction)    7. Peripheral eosinophilia    8.  Continue Brovana, Pulmicort and Yupelri as prescribed.  Rinse mouth after each use.  9.  Continue albuterol or DuoNebs as needed.  10.  Restart Nucala.  11.  Continue BiPAP and supplemental oxygen at current settings.  12.  Follow-up in 3 months with Elizabeth, 6 months with Dr. Silvestre.        Follow Up   Return in about 3 months (around 12/12/2023) for Recheck 3 month with Elizabeth, 6 month with Konrad.  Patient was given instructions and counseling regarding his condition or for health maintenance advice. Please see specific information pulled into the AVS if appropriate.

## 2023-09-15 ENCOUNTER — TELEPHONE (OUTPATIENT)
Dept: PULMONOLOGY | Facility: CLINIC | Age: 78
End: 2023-09-15
Payer: OTHER GOVERNMENT

## 2023-09-15 NOTE — TELEPHONE ENCOUNTER
Melissa with [facility] known [facility] asking if patient is still on Brovana, Pulmicort, and Yupelri. Informed Melissa per last chart note 9/12/23 note states continue those 3 nebs.

## 2023-12-12 ENCOUNTER — OFFICE VISIT (OUTPATIENT)
Dept: PULMONOLOGY | Facility: CLINIC | Age: 78
End: 2023-12-12
Payer: OTHER GOVERNMENT

## 2023-12-12 VITALS
BODY MASS INDEX: 31.64 KG/M2 | TEMPERATURE: 97.8 F | SYSTOLIC BLOOD PRESSURE: 150 MMHG | OXYGEN SATURATION: 96 % | RESPIRATION RATE: 18 BRPM | HEART RATE: 79 BPM | DIASTOLIC BLOOD PRESSURE: 90 MMHG | HEIGHT: 68 IN | WEIGHT: 208.8 LBS

## 2023-12-12 DIAGNOSIS — R06.2 WHEEZING: ICD-10-CM

## 2023-12-12 DIAGNOSIS — R06.09 DYSPNEA ON EXERTION: ICD-10-CM

## 2023-12-12 DIAGNOSIS — G47.33 OSA (OBSTRUCTIVE SLEEP APNEA): ICD-10-CM

## 2023-12-12 DIAGNOSIS — J44.9 CHRONIC OBSTRUCTIVE PULMONARY DISEASE, UNSPECIFIED COPD TYPE: ICD-10-CM

## 2023-12-12 DIAGNOSIS — R06.02 SOB (SHORTNESS OF BREATH): ICD-10-CM

## 2023-12-12 DIAGNOSIS — J45.50 SEVERE PERSISTENT ASTHMA WITHOUT COMPLICATION: Primary | ICD-10-CM

## 2023-12-12 RX ORDER — REVEFENACIN 175 UG/3ML
175 SOLUTION RESPIRATORY (INHALATION)
Qty: 270 ML | Refills: 3 | Status: SHIPPED | OUTPATIENT
Start: 2023-12-12

## 2023-12-12 RX ORDER — ARFORMOTEROL TARTRATE 15 UG/2ML
15 SOLUTION RESPIRATORY (INHALATION)
Qty: 360 ML | Refills: 3 | Status: SHIPPED | OUTPATIENT
Start: 2023-12-12

## 2023-12-12 RX ORDER — BUDESONIDE 0.5 MG/2ML
0.5 INHALANT ORAL
Qty: 360 EACH | Refills: 3 | Status: SHIPPED | OUTPATIENT
Start: 2023-12-12

## 2023-12-12 RX ORDER — ALBUTEROL SULFATE 2.5 MG/3ML
2.5 SOLUTION RESPIRATORY (INHALATION) EVERY 4 HOURS PRN
COMMUNITY

## 2023-12-12 RX ORDER — FLUOXETINE HYDROCHLORIDE 20 MG/1
20 CAPSULE ORAL
COMMUNITY
End: 2023-12-12 | Stop reason: SDUPTHER

## 2023-12-12 RX ORDER — PREDNISONE 10 MG/1
10 TABLET ORAL DAILY
Qty: 5 TABLET | Refills: 0 | Status: SHIPPED | OUTPATIENT
Start: 2023-12-12

## 2023-12-12 RX ORDER — VUTRISIRAN 25 MG/.5ML
25 INJECTION SUBCUTANEOUS
COMMUNITY
Start: 2023-08-23

## 2023-12-12 NOTE — PATIENT INSTRUCTIONS
COPD and Physical Activity  Chronic obstructive pulmonary disease (COPD) is a long-term, or chronic, condition that affects the lungs. COPD is a general term that can be used to describe many problems that cause inflammation of the lungs and limit airflow. These conditions include chronic bronchitis and emphysema.  The main symptom of COPD is shortness of breath, which makes it harder to do even simple tasks. This can also make it harder to exercise and stay active. Talk with your health care provider about treatments to help you breathe better and actions you can take to prevent breathing problems during physical activity.  What are the benefits of exercising when you have COPD?  Exercising regularly is an important part of a healthy lifestyle. You can still exercise and do physical activities even though you have COPD. Exercise and physical activity improve your shortness of breath by increasing blood flow (circulation). This causes your heart to pump more oxygen through your body. Moderate exercise can:  Improve oxygen use.  Increase your energy level.  Help with shortness of breath.  Strengthen your breathing muscles.  Improve heart health.  Help with sleep.  Improve your self-esteem and feelings of self-worth.  Lower depression, stress, and anxiety.  Exercise can benefit everyone with COPD. The severity of your disease may affect how hard you can exercise, especially at first, but everyone can benefit. Talk with your health care provider about how much exercise is safe for you, and which activities and exercises are safe for you.  What actions can I take to prevent breathing problems during physical activity?  Sign up for a pulmonary rehabilitation program. This type of program may include:  Education about lung diseases.  Exercise classes that teach you how to exercise and be more active while improving your breathing. This usually involves:  Exercise using your lower extremities, such as a stationary  bicycle.  About 30 minutes of exercise, 2 to 5 times per week, for 6 to 12 weeks.  Strength training, such as push-ups or leg lifts.  Nutrition education.  Group classes in which you can talk with others who also have COPD and learn ways to manage stress.  If you use an oxygen tank, you should use it while you exercise. Work with your health care provider to adjust your oxygen for your physical activity. Your resting flow rate is different from your flow rate during physical activity.  How to manage your breathing while exercising  While you are exercising:  Take slow breaths.  Pace yourself, and do nottry to go too fast.  Purse your lips while breathing out. Pursing your lips is similar to a kissing or whistling position.  If doing exercise that uses a quick burst of effort, such as weight lifting:  Breathe in before starting the exercise.  Breathe out during the hardest part of the exercise, such as raising the weights.  Where to find support  You can find support for exercising with COPD from:  Your health care provider.  A pulmonary rehabilitation program.  Your local health department or community health programs.  Support groups, either online or in-person. Your health care provider may be able to recommend support groups.  Where to find more information  You can find more information about exercising with COPD from:  American Lung Association: lung.org  COPD Foundation: copdfoundation.org  Contact a health care provider if:  Your symptoms get worse.  You have nausea.  You have a fever.  You want to start a new exercise program or a new activity.  Get help right away if:  You have chest pain.  You cannot breathe.  These symptoms may represent a serious problem that is an emergency. Do not wait to see if the symptoms will go away. Get medical help right away. Call your local emergency services (911 in the [place] Do not drive yourself to the hospital.  Summary  COPD is a general term that can be used to describe  many different lung problems that cause lung inflammation and limit airflow. This includes chronic bronchitis and emphysema.  Exercise and physical activity improve your shortness of breath by increasing blood flow (circulation). This causes your heart to provide more oxygen to your body.  Contact your health care provider before starting any exercise program or new activity. Ask your health care provider what exercises and activities are safe for you.  This information is not intended to replace advice given to you by your health care provider. Make sure you discuss any questions you have with your health care provider.  Document Revised: 10/26/2021 Document Reviewed: 10/26/2021  [employer] Patient Education © 2023 [employer]

## 2023-12-12 NOTE — PROGRESS NOTES
Primary Care Provider  Torres Acevedo MD     Referring Provider  No ref. provider found     Chief Complaint  Asthma, Shortness of Breath (With exertion ), Cough (Clear thick ), and Follow-up (3 month f/up )    Subjective          Andrew Hart presents to [facility]  History of Present Illness  Andrew Hart is a 78 y.o. male patient of Dr. Silvestre here for management of severe persistent asthma, COPD, peripheral eosinophilia, elevated IgE, shortness of breath and chronic cough.    Patient states he has been doing okay since his last office visit.  He denies using antibiotics or steroids for his lungs.  He does have a productive cough with clear sputum.  He denies any current fevers or chills.  His shortness of breath is moderate in severity, worse with exertion and improved with rest. He not been on Brovana, Pulmicort or Yupelri for approximately 2 months.  [facility] that [facility] and [facility] initially [facility] these [facility].  These medications were ordered by Dr. Silvestre in March 2023.  I will reorder these medications for the patient today.  He has been using his albuterol inhaler 1-2 times at night.  He continues to wear 2 L of oxygen at night with his BiPAP.  He will intermittently wear it during the day.  I have strongly encourage patient to wear it more often during the day.  He does have questions about a portable oxygen concentrator and I will give him a pamphlet for [employer].  He continues to receive Nucala and is needing samples today.  He recently saw his nephrologist and will be seeing his cardiologist soon.  Otherwise, he has no additional respiratory concerns at this time.  He is able to perform his ADLs with minor modifications.  He is up-to-date with his COVID, flu and pneumonia vaccine.     His history of smoking is   Tobacco Use: Low Risk  (12/12/2023)    Patient History     Smoking Tobacco Use: Never      Smokeless Tobacco Use: Never     Passive Exposure: Never   .    Review of Systems   Constitutional:  Negative for chills, fatigue, fever, unexpected weight gain and unexpected weight loss.   HENT:  Congestion: Nasal.    Respiratory:  Positive for cough, shortness of breath and wheezing. Negative for apnea.         Negative for Hemoptysis     Cardiovascular:  Negative for chest pain, palpitations and leg swelling.   Skin:         Negative for cyanosis      Sleep: Negative for Excessive daytime sleepiness  Negative for morning headaches  Negative for Snoring    Family History   Family history unknown: Yes        Social History     Socioeconomic History    Marital status:    Tobacco Use    Smoking status: Never     Passive exposure: Never    Smokeless tobacco: Never   Vaping Use    Vaping Use: Never used   Substance and Sexual Activity    Alcohol use: Not Currently    Drug use: Never    Sexual activity: Defer        Past Medical History:   Diagnosis Date    Asthma, intrinsic     CHF (congestive heart failure)     COPD (chronic obstructive pulmonary disease)     PTSD (post-traumatic stress disorder)     Renal stone         Immunization History   Administered Date(s) Administered    COVID-19 (MODERNA) 1st,2nd,3rd Dose Monovalent 02/22/2021, 03/29/2021, 10/25/2021, 11/14/2022    COVID-19 (MODERNA) BIVALENT 12+YRS 11/14/2022    COVID-19 F23 (MODERNA) 12YRS+ (SPIKEVAX) 10/18/2023    Fluzone High Dose =>65 Years ([employer] ONLY) 10/26/2016, 12/31/2022, 09/12/2023    Fluzone High-Dose 65+yrs 09/12/2023    PEDS-Pneumococcal Conjugate (PCV7) 09/12/2023    Pneumococcal Conjugate 13-Valent (PCV13) 10/26/2016    Pneumococcal Conjugate 20-Valent (PCV20) 09/12/2023    Shingrix 11/08/2023         No Known Allergies       Current Outpatient Medications:     albuterol (PROVENTIL) (2.5 MG/3ML) 0.083% nebulizer solution, Take 2.5 mg by nebulization Every 4 (Four) Hours As Needed for Wheezing., Disp: , Rfl:     amiodarone  (PACERONE) 200 MG tablet, , Disp: , Rfl:     apixaban (ELIQUIS) 5 MG tablet tablet, Take 1 tablet by mouth Every 12 (Twelve) Hours. Indications: Atrial Fibrillation, Disp: 60 tablet, Rfl:     arformoterol (Brovana) 15 MCG/2ML nebulizer solution, Take 2 mL by nebulization 2 (Two) Times a Day., Disp: 360 mL, Rfl: 3    atorvastatin (LIPITOR) 40 MG tablet, Take 1 tablet by mouth Daily., Disp: , Rfl:     budesonide (Pulmicort) 0.5 MG/2ML nebulizer solution, Take 2 mL by nebulization Daily., Disp: 360 each, Rfl: 3    bumetanide (BUMEX) 1 MG tablet, Take 1 tablet by mouth Daily., Disp: , Rfl:     FLUoxetine (PROzac) 20 MG capsule, Take 1 capsule by mouth Every Morning., Disp: , Rfl:     pantoprazole (PROTONIX) 40 MG EC tablet, , Disp: , Rfl:     potassium chloride (K-DUR,KLOR-CON) 10 MEQ CR tablet, Take 1 tablet by mouth Daily., Disp: , Rfl:     revefenacin (Yupelri) 175 MCG/3ML nebulizer solution, Take 3 mL by nebulization Daily., Disp: 270 mL, Rfl: 3    Tafamidis (VYNDAMAX) 61 MG capsule, Take 1 capsule by mouth., Disp: , Rfl:     Accu-Chek Softclix Lancets lancets, by Other route. Use as instructed (Patient not taking: Reported on 12/12/2023), Disp: , Rfl:     albuterol sulfate  (90 Base) MCG/ACT inhaler, Inhale 2 puffs Every 4 (Four) Hours As Needed for Wheezing. (Patient not taking: Reported on 12/12/2023), Disp: , Rfl:     amiodarone in dextrose 5% (NEXTERONE) infusion, Infuse 0.5 mg/min into a venous catheter Continuous. (Patient not taking: Reported on 9/12/2023), Disp: , Rfl:     cetirizine (zyrTEC) 10 MG tablet, Take 1 tablet by mouth Daily. (Patient not taking: Reported on 9/12/2023), Disp: , Rfl:     donepezil (ARICEPT) 5 MG tablet, Take 1 tablet by mouth Every Night. (Patient not taking: Reported on 9/12/2023), Disp: , Rfl:     furosemide (LASIX) 10 MG/ML injection, Infuse 4 mL into a venous catheter Every 8 (Eight) Hours. (Patient not taking: Reported on 9/12/2023), Disp: , Rfl:     insulin glargine  (LANTUS, SEMGLEE) 100 UNIT/ML injection, Inject 35 Units under the skin into the appropriate area as directed Every Night. (Patient not taking: Reported on 12/12/2023), Disp: , Rfl:     ipratropium-albuterol (DUO-NEB) 0.5-2.5 mg/3 ml nebulizer, Take 3 mL by nebulization Every 4 (Four) Hours As Needed for Wheezing. (Patient not taking: Reported on 12/12/2023), Disp: , Rfl:     ketotifen (ZADITOR) 0.025 % ophthalmic solution, Administer 1 drop to both eyes 2 (Two) Times a Day. (Patient not taking: Reported on 9/12/2023), Disp: , Rfl:     metoprolol succinate XL (TOPROL-XL) 25 MG 24 hr tablet, Take 1 tablet by mouth Every 12 (Twelve) Hours. (Patient not taking: Reported on 9/12/2023), Disp: , Rfl:     midodrine (PROAMATINE) 10 MG tablet, Take 1 tablet by mouth 3 (Three) Times a Day Before Meals. (Patient not taking: Reported on 9/12/2023), Disp: , Rfl:     predniSONE (DELTASONE) 10 MG tablet, Take 1 tablet by mouth Daily., Disp: 5 tablet, Rfl: 0    tiotropium bromide-olodaterol (STIOLTO RESPIMAT) 2.5-2.5 MCG/ACT aerosol solution inhaler, Inhale 2 puffs Daily. (Patient not taking: Reported on 9/12/2023), Disp: , Rfl:     Vutrisiran Sodium (Amvuttra) 25 MG/0.5ML solution prefilled syringe syringe, 0.5 mL. (Patient not taking: Reported on 12/12/2023), Disp: , Rfl:      Objective   Physical Exam  Constitutional:       General: He is not in acute distress.     Appearance: Normal appearance. He is normal weight.   HENT:      Right Ear: Hearing normal.      Left Ear: Hearing normal.      Nose: No nasal tenderness or congestion.      Mouth/Throat:      Mouth: Mucous membranes are moist. No oral lesions.   Eyes:      Extraocular Movements: Extraocular movements intact.      Pupils: Pupils are equal, round, and reactive to light.   Neck:      Thyroid: No thyroid mass or thyromegaly.   Cardiovascular:      Rate and Rhythm: Normal rate and regular rhythm.      Pulses: Normal pulses.      Heart sounds: Normal heart sounds. No  "murmur heard.  Pulmonary:      Effort: Pulmonary effort is normal.      Breath sounds: Decreased breath sounds and wheezing present. No rhonchi or rales.   Musculoskeletal:      Cervical back: Neck supple.      Right lower leg: No edema.      Left lower leg: No edema.   Lymphadenopathy:      Cervical: No cervical adenopathy.      Upper Body:      Right upper body: No axillary adenopathy.   Skin:     General: Skin is warm and dry.      Findings: No lesion or rash.   Neurological:      General: No focal deficit present.      Mental Status: He is alert and oriented to person, place, and time.   Psychiatric:         Mood and Affect: Affect normal. Mood is not anxious or depressed.         Vital Signs:   /90 (BP Location: Left arm, Patient Position: Sitting, Cuff Size: Large Adult)   Pulse 79   Temp 97.8 °F (36.6 °C) (Temporal)   Resp 18   Ht 172.7 cm (68\")   Wt 94.7 kg (208 lb 12.8 oz)   SpO2 96% Comment: RA; 2 L's PRN  BMI 31.75 kg/m²        Result Review :   The following data was reviewed by: TONY Tillman on 12/12/2023:  CMP          8/4/2023    04:21 8/5/2023    05:56 8/5/2023    18:13 8/6/2023    02:33 8/6/2023    12:04   CMP   Glucose 131  102  131  131     BUN 28  42   58     Creatinine 1.44  1.83   2.56     EGFR 50.7  38.0   25.4     Sodium 138  135  135.1  135     Potassium 3.8  4.2   4.4     Chloride 104  100   98     Calcium 9.3  9.5   9.6     Total Protein     6.5    Albumin     3.5    Total Bilirubin     0.8    Alkaline Phosphatase     110    AST (SGOT)     1,637    ALT (SGPT)     1,485    BUN/Creatinine Ratio 19.4  23.0   22.7     Anion Gap 11.9  11.5   17.4       CBC w/diff          8/4/2023    04:21 8/5/2023    05:56 8/6/2023    02:33   CBC w/Diff   WBC 7.28  12.73  11.71    RBC 4.70  4.75  5.18    Hemoglobin 13.4  13.7  14.9    Hematocrit 40.7  40.6  43.5    MCV 86.6  85.5  84.0    MCH 28.5  28.8  28.8    MCHC 32.9  33.7  34.3    RDW 17.2  17.2  16.7    Platelets 226  262  227  "   Neutrophil Rel % 86.8  75.8  77.4    Immature Granulocyte Rel % 0.4  0.3  0.7    Lymphocyte Rel % 10.4  11.5  9.9    Monocyte Rel % 2.3  12.2  11.8    Eosinophil Rel % 0.0  0.0  0.0    Basophil Rel % 0.1  0.2  0.2      Data reviewed : Radiologic studies chest CT 3/6/2023, chest x-ray 8/5/2023, pulmonary function test 3/6/2023, Cardiology studies echocardiogram 8/4/2023, and my last office note    Procedures        Assessment and Plan    Diagnoses and all orders for this visit:    1. Severe persistent asthma without complication (Primary)  -     arformoterol (Brovana) 15 MCG/2ML nebulizer solution; Take 2 mL by nebulization 2 (Two) Times a Day.  Dispense: 360 mL; Refill: 3  -     budesonide (Pulmicort) 0.5 MG/2ML nebulizer solution; Take 2 mL by nebulization Daily.  Dispense: 360 each; Refill: 3  -     revefenacin (Yupelri) 175 MCG/3ML nebulizer solution; Take 3 mL by nebulization Daily.  Dispense: 270 mL; Refill: 3    2. Chronic obstructive pulmonary disease, unspecified COPD type  -     arformoterol (Brovana) 15 MCG/2ML nebulizer solution; Take 2 mL by nebulization 2 (Two) Times a Day.  Dispense: 360 mL; Refill: 3  -     budesonide (Pulmicort) 0.5 MG/2ML nebulizer solution; Take 2 mL by nebulization Daily.  Dispense: 360 each; Refill: 3  -     revefenacin (Yupelri) 175 MCG/3ML nebulizer solution; Take 3 mL by nebulization Daily.  Dispense: 270 mL; Refill: 3    3. Dyspnea on exertion    4. VICTOR MANUEL (obstructive sleep apnea)    5. SOB (shortness of breath)    6. Wheezing  -     predniSONE (DELTASONE) 10 MG tablet; Take 1 tablet by mouth Daily.  Dispense: 5 tablet; Refill: 0    7.  Restart Brovana, Pulmicort and Yupelri as prescribed.  Rinse mouth after each use.  8.  Continue albuterol or DuoNebs as needed.  9.  Continue Nucala.  10.  Continue BiPAP and supplemental oxygen at current settings.  11.  Follow-up as scheduled, sooner if needed        Follow Up   Return for Next scheduled follow up.  Patient was given  instructions and counseling regarding his condition or for health maintenance advice. Please see specific information pulled into the AVS if appropriate.

## 2024-03-29 ENCOUNTER — OFFICE VISIT (OUTPATIENT)
Dept: PULMONOLOGY | Facility: CLINIC | Age: 79
End: 2024-03-29
Payer: OTHER GOVERNMENT

## 2024-03-29 VITALS
WEIGHT: 203.2 LBS | RESPIRATION RATE: 18 BRPM | BODY MASS INDEX: 30.8 KG/M2 | SYSTOLIC BLOOD PRESSURE: 117 MMHG | DIASTOLIC BLOOD PRESSURE: 66 MMHG | OXYGEN SATURATION: 97 % | HEIGHT: 68 IN | HEART RATE: 62 BPM | TEMPERATURE: 97.6 F

## 2024-03-29 DIAGNOSIS — J44.9 CHRONIC OBSTRUCTIVE PULMONARY DISEASE, UNSPECIFIED COPD TYPE: Chronic | ICD-10-CM

## 2024-03-29 DIAGNOSIS — R05.3 CHRONIC COUGH: Chronic | ICD-10-CM

## 2024-03-29 DIAGNOSIS — Z23 ENCOUNTER FOR IMMUNIZATION: ICD-10-CM

## 2024-03-29 DIAGNOSIS — J45.50 SEVERE PERSISTENT ASTHMA WITHOUT COMPLICATION: Primary | Chronic | ICD-10-CM

## 2024-03-29 DIAGNOSIS — R06.09 DYSPNEA ON EXERTION: ICD-10-CM

## 2024-03-29 RX ORDER — ONDANSETRON 4 MG/1
TABLET, FILM COATED ORAL
COMMUNITY
Start: 2024-03-23

## 2024-03-29 RX ORDER — ALBUTEROL SULFATE 2.5 MG/3ML
2.5 SOLUTION RESPIRATORY (INHALATION) EVERY 4 HOURS PRN
Qty: 360 EACH | Refills: 3 | Status: SHIPPED | OUTPATIENT
Start: 2024-03-29

## 2024-03-29 RX ORDER — BUDESONIDE 0.5 MG/2ML
0.5 INHALANT ORAL
Qty: 360 ML | Refills: 3 | Status: SHIPPED | OUTPATIENT
Start: 2024-03-29

## 2024-03-29 RX ORDER — REVEFENACIN 175 UG/3ML
175 SOLUTION RESPIRATORY (INHALATION)
Qty: 270 ML | Refills: 3 | Status: SHIPPED | OUTPATIENT
Start: 2024-03-29

## 2024-03-29 RX ORDER — ALBUTEROL SULFATE 90 UG/1
2 AEROSOL, METERED RESPIRATORY (INHALATION) EVERY 4 HOURS PRN
Qty: 54 G | Refills: 3 | Status: SHIPPED | OUTPATIENT
Start: 2024-03-29

## 2024-03-29 RX ORDER — ARFORMOTEROL TARTRATE 15 UG/2ML
15 SOLUTION RESPIRATORY (INHALATION)
Qty: 360 ML | Refills: 3 | Status: SHIPPED | OUTPATIENT
Start: 2024-03-29

## 2024-03-29 RX ORDER — BUMETANIDE 2 MG/1
2 TABLET ORAL
COMMUNITY

## 2024-03-29 RX ORDER — SODIUM, POTASSIUM,MAG SULFATES 17.5-3.13G
SOLUTION, RECONSTITUTED, ORAL ORAL
COMMUNITY
Start: 2023-12-28

## 2024-03-29 NOTE — PATIENT INSTRUCTIONS
COPD and Physical Activity  Chronic obstructive pulmonary disease (COPD) is a long-term, or chronic, condition that affects the lungs. COPD is a general term that can be used to describe many problems that cause inflammation of the lungs and limit airflow. These conditions include chronic bronchitis and emphysema.  The main symptom of COPD is shortness of breath, which makes it harder to do even simple tasks. This can also make it harder to exercise and stay active. Talk with your health care provider about treatments to help you breathe better and actions you can take to prevent breathing problems during physical activity.  What are the benefits of exercising when you have COPD?  Exercising regularly is an important part of a healthy lifestyle. You can still exercise and do physical activities even though you have COPD. Exercise and physical activity improve your shortness of breath by increasing blood flow (circulation). This causes your heart to pump more oxygen through your body. Moderate exercise can:  Improve oxygen use.  Increase your energy level.  Help with shortness of breath.  Strengthen your breathing muscles.  Improve heart health.  Help with sleep.  Improve your self-esteem and feelings of self-worth.  Lower depression, stress, and anxiety.  Exercise can benefit everyone with COPD. The severity of your disease may affect how hard you can exercise, especially at first, but everyone can benefit. Talk with your health care provider about how much exercise is safe for you, and which activities and exercises are safe for you.  What actions can I take to prevent breathing problems during physical activity?  Sign up for a pulmonary rehabilitation program. This type of program may include:  Education about lung diseases.  Exercise classes that teach you how to exercise and be more active while improving your breathing. This usually involves:  Exercise using your lower extremities, such as a stationary  bicycle.  About 30 minutes of exercise, 2 to 5 times per week, for 6 to 12 weeks.  Strength training, such as push-ups or leg lifts.  Nutrition education.  Group classes in which you can talk with others who also have COPD and learn ways to manage stress.  If you use an oxygen tank, you should use it while you exercise. Work with your health care provider to adjust your oxygen for your physical activity. Your resting flow rate is different from your flow rate during physical activity.  How to manage your breathing while exercising  While you are exercising:  Take slow breaths.  Pace yourself, and do nottry to go too fast.  Purse your lips while breathing out. Pursing your lips is similar to a kissing or whistling position.  If doing exercise that uses a quick burst of effort, such as weight lifting:  Breathe in before starting the exercise.  Breathe out during the hardest part of the exercise, such as raising the weights.  Where to find support  You can find support for exercising with COPD from:  Your health care provider.  A pulmonary rehabilitation program.  Your local health department or community health programs.  Support groups, either online or in-person. Your health care provider may be able to recommend support groups.  Where to find more information  You can find more information about exercising with COPD from:  American Lung Association: lung.org  COPD Foundation: copdfoundation.org  Contact a health care provider if:  Your symptoms get worse.  You have nausea.  You have a fever.  You want to start a new exercise program or a new activity.  Get help right away if:  You have chest pain.  You cannot breathe.  These symptoms may represent a serious problem that is an emergency. Do not wait to see if the symptoms will go away. Get medical help right away. Call your local emergency services (911 in the [place] Do not drive yourself to the hospital.  Summary  COPD is a general term that can be used to describe  many different lung problems that cause lung inflammation and limit airflow. This includes chronic bronchitis and emphysema.  Exercise and physical activity improve your shortness of breath by increasing blood flow (circulation). This causes your heart to provide more oxygen to your body.  Contact your health care provider before starting any exercise program or new activity. Ask your health care provider what exercises and activities are safe for you.  This information is not intended to replace advice given to you by your health care provider. Make sure you discuss any questions you have with your health care provider.  Document Revised: 10/26/2021 Document Reviewed: 10/26/2021  [employer] Patient Education © 2023 [employer]  Sleep Apnea  Sleep apnea is a condition in which breathing pauses or becomes shallow during sleep. People with sleep apnea usually snore loudly. They may have times when they gasp and stop breathing for 10 seconds or more during sleep. This may happen many times during the night.  Sleep apnea disrupts your sleep and keeps your body from getting the rest that it needs. This condition can increase your risk of certain health problems, including:  Heart attack.  Stroke.  Obesity.  Type 2 diabetes.  Heart failure.  Irregular heartbeat.  High blood pressure.  The goal of treatment is to help you breathe normally again.  What are the causes?    The most common cause of sleep apnea is a collapsed or blocked airway.  There are three kinds of sleep apnea:  Obstructive sleep apnea. This kind is caused by a blocked or collapsed airway.  Central sleep apnea. This kind happens when the part of the brain that controls breathing does not send the correct signals to the muscles that control breathing.  Mixed sleep apnea. This is a combination of obstructive and central sleep apnea.  What increases the risk?  You are more likely to develop this condition if you:  Are overweight.  Smoke.  Have a smaller than  normal airway.  Are older.  Are male.  Drink alcohol.  Take sedatives or tranquilizers.  Have a family history of sleep apnea.  Have a tongue or tonsils that are larger than normal.  What are the signs or symptoms?  Symptoms of this condition include:  Trouble staying asleep.  Loud snoring.  Morning headaches.  Waking up gasping.  Dry mouth or sore throat in the morning.  Daytime sleepiness and tiredness.  If you have daytime fatigue because of sleep apnea, you may be more likely to have:  Trouble concentrating.  Forgetfulness.  Irritability or mood swings.  Personality changes.  Feelings of depression.  Sexual dysfunction. This may include loss of interest if you are female, or erectile dysfunction if you are male.  How is this diagnosed?  This condition may be diagnosed with:  A medical history.  A physical exam.  A series of tests that are done while you are sleeping (sleep study). These tests are usually done in a sleep lab, but they may also be done at home.  How is this treated?  Treatment for this condition aims to restore normal breathing and to ease symptoms during sleep. It may involve managing health issues that can affect breathing, such as high blood pressure or obesity. Treatment may include:  Sleeping on your side.  Using a decongestant if you have nasal congestion.  Avoiding the use of depressants, including alcohol, sedatives, and narcotics.  Losing weight if you are overweight.  Making changes to your diet.  Quitting smoking.  Using a device to open your airway while you sleep, such as:  An oral appliance. This is a custom-made mouthpiece that shifts your lower jaw forward.  A continuous positive airway pressure (CPAP) device. This device blows air through a mask when you breathe out (exhale).  A nasal expiratory positive airway pressure (EPAP) device. This device has valves that you put into each nostril.  A bi-level positive airway pressure (BIPAP) device. This device blows air through a mask  when you breathe in (inhale) and breathe out (exhale).  Having surgery if other treatments do not work. During surgery, excess tissue is removed to create a wider airway.  Follow these instructions at home:  Lifestyle  Make any lifestyle changes that your health care provider recommends.  Eat a healthy, well-balanced diet.  Take steps to lose weight if you are overweight.  Avoid using depressants, including alcohol, sedatives, and narcotics.  Do not use any products that contain nicotine or tobacco. These products include cigarettes, chewing tobacco, and vaping devices, such as e-cigarettes. If you need help quitting, ask your health care provider.  General instructions  Take over-the-counter and prescription medicines only as told by your health care provider.  If you were given a device to open your airway while you sleep, use it only as told by your health care provider.  If you are having surgery, make sure to tell your health care provider you have sleep apnea. You may need to bring your device with you.  Keep all follow-up visits. This is important.  Contact a health care provider if:  The device that you received to open your airway during sleep is uncomfortable or does not seem to be working.  Your symptoms do not improve.  Your symptoms get worse.  Get help right away if:  You develop:  Chest pain.  Shortness of breath.  Discomfort in your back, arms, or stomach.  You have:  Trouble speaking.  Weakness on one side of your body.  Drooping in your face.  These symptoms may represent a serious problem that is an emergency. Do not wait to see if the symptoms will go away. Get medical help right away. Call your local emergency services (911 in the [place] Do not drive yourself to the hospital.  Summary  Sleep apnea is a condition in which breathing pauses or becomes shallow during sleep.  The most common cause is a collapsed or blocked airway.  The goal of treatment is to restore normal breathing and to ease  symptoms during sleep.  This information is not intended to replace advice given to you by your health care provider. Make sure you discuss any questions you have with your health care provider.  Document Revised: 07/27/2022 Document Reviewed: 11/26/2021  [employer] Patient Education © 2023 [employer]

## 2024-03-29 NOTE — PROGRESS NOTES
Primary Care Provider  Torres Acevedo MD     Referring Provider  Choco Carrasco MD     Chief Complaint  Asthma, Shortness of Breath (With exertion ), Cough (Clear thick mucus ), Wheezing (Laying down, exertion ), and Follow-up (6 month f/up )    Subjective          Andrew Hart presents to [facility] PULMONARY & CRITICAL CARE MEDICINE  History of Present Illness  Andrew Hart is a 78 y.o. male patient of Dr. Silvestre here for management of severe persistent asthma, COPD, shortness of breath and chronic cough.    Patient states he has been doing okay since his last office visit. He denies using antibiotics or steroids for his lungs. He does have a productive cough with clear sputum.  His shortness of breath is moderate in severity, worse with exertion and improved with rest.  He has not been on Brovana, Pulmicort or Yupelri since at least December.  [facility] and he is open to me reordering at this time.  He uses his albuterol 3 times a day.  He continues to wear a PAP therapy nightly and is benefiting from its use.  He is on 2 L of oxygen as needed.  Patient states that he does not wear it often.  Overall, he states that he is doing okay and has no additional concerns at this time.  He is able to perform his ADLs without difficulty.  He is up-to-date with his COVID, flu and pneumonia vaccines.  He would like an RSV vaccine sent to his local pharmacy.     His history of smoking is   Tobacco Use: Low Risk  (3/29/2024)    Patient History     Smoking Tobacco Use: Never     Smokeless Tobacco Use: Never     Passive Exposure: Never   .    Review of Systems   Constitutional:  Negative for chills, fatigue, fever, unexpected weight gain and unexpected weight loss.   HENT:  Congestion: Nasal.    Respiratory:  Positive for cough and shortness of breath. Negative for apnea and wheezing.         Negative for Hemoptysis     Cardiovascular:  Negative for chest pain,  palpitations and leg swelling.   Skin:         Negative for cyanosis      Sleep: Negative for Excessive daytime sleepiness  Negative for morning headaches  Negative for Snoring    Family History   Family history unknown: Yes        Social History     Socioeconomic History    Marital status:    Tobacco Use    Smoking status: Never     Passive exposure: Never    Smokeless tobacco: Never   Vaping Use    Vaping status: Never Used   Substance and Sexual Activity    Alcohol use: Not Currently    Drug use: Never    Sexual activity: Defer        Past Medical History:   Diagnosis Date    Asthma, intrinsic     CHF (congestive heart failure)     COPD (chronic obstructive pulmonary disease)     PTSD (post-traumatic stress disorder)     Renal stone         Immunization History   Administered Date(s) Administered    COVID-19 (MODERNA) 1st,2nd,3rd Dose Monovalent 02/22/2021, 03/29/2021, 10/25/2021, 11/14/2022    COVID-19 (MODERNA) BIVALENT 12+YRS 11/14/2022    COVID-19 F23 (MODERNA) 12YRS+ (SPIKEVAX) 10/18/2023    Fluzone High Dose =>65 Years ([facility] ONLY) 10/26/2016, 12/31/2022, 09/12/2023    Fluzone High-Dose 65+yrs 09/12/2023    PEDS-Pneumococcal Conjugate (PCV7) 09/12/2023    Pneumococcal Conjugate 13-Valent (PCV13) 10/26/2016    Pneumococcal Conjugate 20-Valent (PCV20) 09/12/2023    Shingrix 11/08/2023         No Known Allergies       Current Outpatient Medications:     albuterol (PROVENTIL) (2.5 MG/3ML) 0.083% nebulizer solution, Take 2.5 mg by nebulization Every 4 (Four) Hours As Needed for Wheezing., Disp: 360 each, Rfl: 3    albuterol sulfate  (90 Base) MCG/ACT inhaler, Inhale 2 puffs Every 4 (Four) Hours As Needed for Wheezing., Disp: 54 g, Rfl: 3    amiodarone (PACERONE) 200 MG tablet, , Disp: , Rfl:     apixaban (ELIQUIS) 5 MG tablet tablet, Take 1 tablet by mouth Every 12 (Twelve) Hours. Indications: Atrial Fibrillation, Disp: 60 tablet, Rfl:     arformoterol (Brovana) 15 MCG/2ML nebulizer solution,  Take 2 mL by nebulization 2 (Two) Times a Day., Disp: 360 mL, Rfl: 3    atorvastatin (LIPITOR) 40 MG tablet, Take 1 tablet by mouth Daily., Disp: , Rfl:     budesonide (Pulmicort) 0.5 MG/2ML nebulizer solution, Take 2 mL by nebulization 2 (Two) Times a Day., Disp: 360 mL, Rfl: 3    bumetanide (BUMEX) 2 MG tablet, Take 1 tablet by mouth., Disp: , Rfl:     FLUoxetine (PROzac) 20 MG capsule, Take 1 capsule by mouth Every Morning., Disp: , Rfl:     pantoprazole (PROTONIX) 40 MG EC tablet, , Disp: , Rfl:     potassium chloride (K-DUR,KLOR-CON) 10 MEQ CR tablet, Take 1 tablet by mouth Daily., Disp: , Rfl:     revefenacin (Yupelri) 175 MCG/3ML nebulizer solution, Take 3 mL by nebulization Daily., Disp: 270 mL, Rfl: 3    Vitamin A 3 MG (68598 UT) capsule, Take 1 tablet by mouth Daily., Disp: , Rfl:     Accu-Chek Softclix Lancets lancets, by Other route. Use as instructed (Patient not taking: Reported on 12/12/2023), Disp: , Rfl:     amiodarone in dextrose 5% (NEXTERONE) infusion, Infuse 0.5 mg/min into a venous catheter Continuous. (Patient not taking: Reported on 9/12/2023), Disp: , Rfl:     bumetanide (BUMEX) 1 MG tablet, Take 1 tablet by mouth Daily. (Patient not taking: Reported on 3/29/2024), Disp: , Rfl:     cetirizine (zyrTEC) 10 MG tablet, Take 1 tablet by mouth Daily. (Patient not taking: Reported on 9/12/2023), Disp: , Rfl:     donepezil (ARICEPT) 5 MG tablet, Take 1 tablet by mouth Every Night. (Patient not taking: Reported on 9/12/2023), Disp: , Rfl:     furosemide (LASIX) 10 MG/ML injection, Infuse 4 mL into a venous catheter Every 8 (Eight) Hours. (Patient not taking: Reported on 9/12/2023), Disp: , Rfl:     insulin glargine (LANTUS, SEMGLEE) 100 UNIT/ML injection, Inject 35 Units under the skin into the appropriate area as directed Every Night. (Patient not taking: Reported on 12/12/2023), Disp: , Rfl:     ipratropium-albuterol (DUO-NEB) 0.5-2.5 mg/3 ml nebulizer, Take 3 mL by nebulization Every 4 (Four)  Hours As Needed for Wheezing. (Patient not taking: Reported on 12/12/2023), Disp: , Rfl:     ketotifen (ZADITOR) 0.025 % ophthalmic solution, Administer 1 drop to both eyes 2 (Two) Times a Day. (Patient not taking: Reported on 9/12/2023), Disp: , Rfl:     metoprolol succinate XL (TOPROL-XL) 25 MG 24 hr tablet, Take 1 tablet by mouth Every 12 (Twelve) Hours. (Patient not taking: Reported on 9/12/2023), Disp: , Rfl:     midodrine (PROAMATINE) 10 MG tablet, Take 1 tablet by mouth 3 (Three) Times a Day Before Meals. (Patient not taking: Reported on 9/12/2023), Disp: , Rfl:     ondansetron (ZOFRAN) 4 MG tablet, , Disp: , Rfl:     predniSONE (DELTASONE) 10 MG tablet, Take 1 tablet by mouth Daily. (Patient not taking: Reported on 3/29/2024), Disp: 5 tablet, Rfl: 0    RSVPreF3 Vac Recomb Adjuvanted (AREXVY) 120 MCG/0.5ML reconstituted suspension injection, Inject 0.5 mL into the appropriate muscle as directed by prescriber 1 (One) Time for 1 dose., Disp: 0.5 mL, Rfl: 0    sodium-potassium-magnesium sulfates (SUPREP) 17.5-3.13-1.6 GM/177ML solution oral solution, , Disp: , Rfl:     Tafamidis (VYNDAMAX) 61 MG capsule, Take 1 capsule by mouth. (Patient not taking: Reported on 3/29/2024), Disp: , Rfl:     Vutrisiran Sodium (Amvuttra) 25 MG/0.5ML solution prefilled syringe syringe, 0.5 mL. (Patient not taking: Reported on 12/12/2023), Disp: , Rfl:      Objective   Physical Exam  Constitutional:       General: He is not in acute distress.     Appearance: Normal appearance. He is normal weight.   HENT:      Right Ear: Hearing normal.      Left Ear: Hearing normal.      Nose: No nasal tenderness or congestion.      Mouth/Throat:      Mouth: Mucous membranes are moist. No oral lesions.   Eyes:      Extraocular Movements: Extraocular movements intact.      Pupils: Pupils are equal, round, and reactive to light.   Neck:      Thyroid: No thyroid mass or thyromegaly.   Cardiovascular:      Rate and Rhythm: Normal rate and regular  "rhythm.      Pulses: Normal pulses.      Heart sounds: Normal heart sounds. No murmur heard.  Pulmonary:      Effort: Pulmonary effort is normal.      Breath sounds: Normal breath sounds. Decreased breath sounds present. No wheezing, rhonchi or rales.   Musculoskeletal:      Cervical back: Neck supple.      Right lower leg: No edema.      Left lower leg: No edema.   Lymphadenopathy:      Cervical: No cervical adenopathy.      Upper Body:      Right upper body: No axillary adenopathy.   Skin:     General: Skin is warm and dry.      Findings: No lesion or rash.   Neurological:      General: No focal deficit present.      Mental Status: He is alert and oriented to person, place, and time.   Psychiatric:         Mood and Affect: Affect normal. Mood is not anxious or depressed.         Vital Signs:   /66 (BP Location: Left arm, Patient Position: Sitting, Cuff Size: Large Adult)   Pulse 62   Temp 97.6 °F (36.4 °C) (Temporal)   Resp 18   Ht 172.7 cm (68\")   Wt 92.2 kg (203 lb 3.2 oz)   SpO2 97% Comment: RA; 2 L's PRN  BMI 30.90 kg/m²        Result Review :   The following data was reviewed by: TONY Tillman on 03/29/2024:  CMP          8/4/2023    04:21 8/5/2023    05:56 8/5/2023    18:13 8/6/2023    02:33 8/6/2023    12:04   CMP   Glucose 131  102  131  131     BUN 28  42   58     Creatinine 1.44  1.83   2.56     EGFR 50.7  38.0   25.4     Sodium 138  135  135.1  135     Potassium 3.8  4.2   4.4     Chloride 104  100   98     Calcium 9.3  9.5   9.6     Total Protein     6.5    Albumin     3.5    Total Bilirubin     0.8    Alkaline Phosphatase     110    AST (SGOT)     1,637    ALT (SGPT)     1,485    BUN/Creatinine Ratio 19.4  23.0   22.7     Anion Gap 11.9  11.5   17.4       CBC w/diff          8/4/2023    04:21 8/5/2023    05:56 8/6/2023    02:33   CBC w/Diff   WBC 7.28  12.73  11.71    RBC 4.70  4.75  5.18    Hemoglobin 13.4  13.7  14.9    Hematocrit 40.7  40.6  43.5    MCV 86.6  85.5  84.0    MCH " 28.5  28.8  28.8    MCHC 32.9  33.7  34.3    RDW 17.2  17.2  16.7    Platelets 226  262  227    Neutrophil Rel % 86.8  75.8  77.4    Immature Granulocyte Rel % 0.4  0.3  0.7    Lymphocyte Rel % 10.4  11.5  9.9    Monocyte Rel % 2.3  12.2  11.8    Eosinophil Rel % 0.0  0.0  0.0    Basophil Rel % 0.1  0.2  0.2      Data reviewed : Radiologic studies chest CT 3/6/2023, chest x-ray 8/5/2023, pulmonary function test 3/6/2023 and my last office note    Procedures        Assessment and Plan    Diagnoses and all orders for this visit:    1. Severe persistent asthma without complication (Primary)  Comments:  Restart Brovana, Pulmicort and Yupelri  Orders:  -     albuterol (PROVENTIL) (2.5 MG/3ML) 0.083% nebulizer solution; Take 2.5 mg by nebulization Every 4 (Four) Hours As Needed for Wheezing.  Dispense: 360 each; Refill: 3  -     arformoterol (Brovana) 15 MCG/2ML nebulizer solution; Take 2 mL by nebulization 2 (Two) Times a Day.  Dispense: 360 mL; Refill: 3  -     budesonide (Pulmicort) 0.5 MG/2ML nebulizer solution; Take 2 mL by nebulization 2 (Two) Times a Day.  Dispense: 360 mL; Refill: 3  -     revefenacin (Yupelri) 175 MCG/3ML nebulizer solution; Take 3 mL by nebulization Daily.  Dispense: 270 mL; Refill: 3  -     albuterol sulfate  (90 Base) MCG/ACT inhaler; Inhale 2 puffs Every 4 (Four) Hours As Needed for Wheezing.  Dispense: 54 g; Refill: 3    2. Chronic obstructive pulmonary disease, unspecified COPD type  Comments:  Restart Brovana, Pulmicort and Yupelri  Orders:  -     albuterol (PROVENTIL) (2.5 MG/3ML) 0.083% nebulizer solution; Take 2.5 mg by nebulization Every 4 (Four) Hours As Needed for Wheezing.  Dispense: 360 each; Refill: 3  -     arformoterol (Brovana) 15 MCG/2ML nebulizer solution; Take 2 mL by nebulization 2 (Two) Times a Day.  Dispense: 360 mL; Refill: 3  -     budesonide (Pulmicort) 0.5 MG/2ML nebulizer solution; Take 2 mL by nebulization 2 (Two) Times a Day.  Dispense: 360 mL; Refill: 3  -      revefenacin (Yupelri) 175 MCG/3ML nebulizer solution; Take 3 mL by nebulization Daily.  Dispense: 270 mL; Refill: 3  -     albuterol sulfate  (90 Base) MCG/ACT inhaler; Inhale 2 puffs Every 4 (Four) Hours As Needed for Wheezing.  Dispense: 54 g; Refill: 3    3. Encounter for immunization  Comments:  RSV vaccine sent to local pharmacy  Orders:  -     RSVPreF3 Vac Recomb Adjuvanted (AREXVY) 120 MCG/0.5ML reconstituted suspension injection; Inject 0.5 mL into the appropriate muscle as directed by prescriber 1 (One) Time for 1 dose.  Dispense: 0.5 mL; Refill: 0    4. Chronic cough  Comments:  Stable    5. Dyspnea on exertion  Comments:  Continue albuterol    .  Follow-up in 4 months with Dr. Silvestre, sooner if needed        Follow Up   Return in about 4 months (around 7/29/2024) for Recheck with Konrad.  Patient was given instructions and counseling regarding his condition or for health maintenance advice. Please see specific information pulled into the AVS if appropriate.

## 2024-11-14 ENCOUNTER — OFFICE VISIT (OUTPATIENT)
Dept: PULMONOLOGY | Facility: CLINIC | Age: 79
End: 2024-11-14
Payer: OTHER GOVERNMENT

## 2024-11-14 VITALS
SYSTOLIC BLOOD PRESSURE: 136 MMHG | HEIGHT: 68 IN | WEIGHT: 205 LBS | HEART RATE: 72 BPM | OXYGEN SATURATION: 96 % | DIASTOLIC BLOOD PRESSURE: 86 MMHG | RESPIRATION RATE: 16 BRPM | TEMPERATURE: 97.6 F | BODY MASS INDEX: 31.07 KG/M2

## 2024-11-14 DIAGNOSIS — J44.9 CHRONIC OBSTRUCTIVE PULMONARY DISEASE, UNSPECIFIED COPD TYPE: ICD-10-CM

## 2024-11-14 DIAGNOSIS — J45.50 SEVERE PERSISTENT ASTHMA WITHOUT COMPLICATION: Primary | ICD-10-CM

## 2024-11-14 DIAGNOSIS — J96.11 CHRONIC RESPIRATORY FAILURE WITH HYPOXIA: ICD-10-CM

## 2024-11-14 DIAGNOSIS — R06.09 DYSPNEA ON EXERTION: ICD-10-CM

## 2024-11-14 DIAGNOSIS — G47.33 OSA (OBSTRUCTIVE SLEEP APNEA): ICD-10-CM

## 2024-11-14 DIAGNOSIS — R05.3 CHRONIC COUGH: ICD-10-CM

## 2024-11-14 RX ORDER — EPLONTERSEN 45 MG/45MG
0.8 INJECTION, SOLUTION SUBCUTANEOUS
COMMUNITY
Start: 2024-09-09

## 2024-11-14 RX ORDER — TRAZODONE HYDROCHLORIDE 50 MG/1
0.5 TABLET, FILM COATED ORAL NIGHTLY
COMMUNITY

## 2024-11-14 RX ORDER — ALBUTEROL SULFATE 90 UG/1
2 INHALANT RESPIRATORY (INHALATION) EVERY 4 HOURS PRN
Qty: 54 G | Refills: 3 | Status: SHIPPED | OUTPATIENT
Start: 2024-11-14

## 2024-11-14 NOTE — PROGRESS NOTES
Primary Care Provider  Torres Acevedo MD     Referring Provider  No ref. provider found     Chief Complaint  Asthma, Shortness of Breath (With exertion ), and Follow-up (8 month f/up )    Subjective          Andrew Hart presents to [facility] PULMONARY & CRITICAL CARE MEDICINE  History of Present Illness  Andrew Hart is a 78 y.o. male patient of Dr. Silvestre here for management of severe persistent asthma, COPD, shortness of breath and chronic cough.     Patient states he has been doing okay since his last office visit. He denies using antibiotics or steroids for his lungs.  He denies any current fevers or chills.  His shortness of breath is mild to moderate in severity, worse with exertion and improved with rest.  He states that he did have a hospitalization sometime around October 2024 at [facility].  He continues to use Brovana, Pulmicort and Yupelri as prescribed.  He will intermittently use albuterol if needed.  He continues to wear a CPAP therapy nightly and is benefiting from its use.  [facility]  He is on 2 L of oxygen as needed and is benefiting from its use.  Patient states that his shortness of breath does affect his ability to take a shower but his other ADLs are not affected.  Patient states that he is able to walk at his own pace without getting significantly short of breath.  He is up-to-date with his respiratory vaccinations.     His history of smoking is   Tobacco Use: Low Risk  (11/14/2024)    Patient History     Smoking Tobacco Use: Never     Smokeless Tobacco Use: Never     Passive Exposure: Never   .    Review of Systems   Constitutional:  Negative for chills, fatigue, fever, unexpected weight gain and unexpected weight loss.   HENT:  Congestion: Nasal.    Respiratory:  Positive for shortness of breath. Negative for apnea, cough and wheezing.         Negative for Hemoptysis     Cardiovascular:  Negative for chest pain, palpitations  and leg swelling.   Skin:         Negative for cyanosis      Sleep: Negative for Excessive daytime sleepiness  Negative for morning headaches  Negative for Snoring    Family History   Family history unknown: Yes        Social History     Socioeconomic History    Marital status:    Tobacco Use    Smoking status: Never     Passive exposure: Never    Smokeless tobacco: Never   Vaping Use    Vaping status: Never Used   Substance and Sexual Activity    Alcohol use: Not Currently    Drug use: Never    Sexual activity: Defer        Past Medical History:   Diagnosis Date    Asthma, intrinsic     CHF (congestive heart failure)     COPD (chronic obstructive pulmonary disease)     PTSD (post-traumatic stress disorder)     Renal stone         Immunization History   Administered Date(s) Administered    ABRYSVO (RSV, 60+ or pregnant women 32-36 wks) 04/01/2024    COVID-19 (MODERNA) 12YRS+ (SPIKEVAX) 10/18/2023    COVID-19 (MODERNA) 1st,2nd,3rd Dose Monovalent 02/22/2021, 03/29/2021, 10/25/2021, 11/14/2022    COVID-19 (MODERNA) BIVALENT 12+YRS 11/14/2022    Fluzone High-Dose 65+YRS 10/26/2016, 12/31/2022, 09/12/2023    Fluzone High-Dose 65+yrs 09/12/2023    PEDS-Pneumococcal Conjugate (PCV7) 09/12/2023    Pneumococcal Conjugate 13-Valent (PCV13) 10/26/2016    Pneumococcal Conjugate 20-Valent (PCV20) 09/12/2023    Shingrix 11/08/2023         No Known Allergies       Current Outpatient Medications:     albuterol (PROVENTIL) (2.5 MG/3ML) 0.083% nebulizer solution, Take 2.5 mg by nebulization Every 4 (Four) Hours As Needed for Wheezing., Disp: 360 each, Rfl: 3    albuterol sulfate  (90 Base) MCG/ACT inhaler, Inhale 2 puffs Every 4 (Four) Hours As Needed for Wheezing., Disp: 54 g, Rfl: 3    amiodarone (PACERONE) 200 MG tablet, , Disp: , Rfl:     apixaban (ELIQUIS) 5 MG tablet tablet, Take 1 tablet by mouth Every 12 (Twelve) Hours. Indications: Atrial Fibrillation, Disp: 60 tablet, Rfl:     arformoterol (Brovana) 15  MCG/2ML nebulizer solution, Take 2 mL by nebulization 2 (Two) Times a Day., Disp: 360 mL, Rfl: 3    atorvastatin (LIPITOR) 40 MG tablet, Take 1 tablet by mouth Daily., Disp: , Rfl:     budesonide (Pulmicort) 0.5 MG/2ML nebulizer solution, Take 2 mL by nebulization 2 (Two) Times a Day., Disp: 360 mL, Rfl: 3    bumetanide (BUMEX) 2 MG tablet, Take 1 tablet by mouth., Disp: , Rfl:     empagliflozin (JARDIANCE) 25 MG tablet tablet, Take 0.5 tablets by mouth., Disp: , Rfl:     FLUoxetine (PROzac) 20 MG capsule, Take 1 capsule by mouth Every Morning., Disp: , Rfl:     ondansetron (ZOFRAN) 4 MG tablet, , Disp: , Rfl:     pantoprazole (PROTONIX) 40 MG EC tablet, , Disp: , Rfl:     potassium chloride (K-DUR,KLOR-CON) 10 MEQ CR tablet, Take 1 tablet by mouth Daily., Disp: , Rfl:     revefenacin (Yupelri) 175 MCG/3ML nebulizer solution, Take 3 mL by nebulization Daily., Disp: 270 mL, Rfl: 3    traZODone (DESYREL) 50 MG tablet, Take 0.5 tablets by mouth Every Night., Disp: , Rfl:     Vitamin A 3 MG (42434 UT) capsule, Take 1 tablet by mouth Daily., Disp: , Rfl:     [place] 45 MG/0.8ML solution auto-injector, Inject 0.8 mL under the skin into the appropriate area as directed., Disp: , Rfl:     Accu-Chek Softclix Lancets lancets, by Other route. Use as instructed (Patient not taking: Reported on 11/14/2024), Disp: , Rfl:     amiodarone in dextrose 5% (NEXTERONE) infusion, Infuse 0.5 mg/min into a venous catheter Continuous. (Patient not taking: Reported on 11/14/2024), Disp: , Rfl:     bumetanide (BUMEX) 1 MG tablet, Take 1 tablet by mouth Daily. (Patient not taking: Reported on 11/14/2024), Disp: , Rfl:     cetirizine (zyrTEC) 10 MG tablet, Take 1 tablet by mouth Daily. (Patient not taking: Reported on 11/14/2024), Disp: , Rfl:     donepezil (ARICEPT) 5 MG tablet, Take 1 tablet by mouth Every Night. (Patient not taking: Reported on 11/14/2024), Disp: , Rfl:     furosemide (LASIX) 10 MG/ML injection, Infuse 4 mL into a venous  catheter Every 8 (Eight) Hours. (Patient not taking: Reported on 11/14/2024), Disp: , Rfl:     insulin glargine (LANTUS, SEMGLEE) 100 UNIT/ML injection, Inject 35 Units under the skin into the appropriate area as directed Every Night. (Patient not taking: Reported on 11/14/2024), Disp: , Rfl:     ipratropium-albuterol (DUO-NEB) 0.5-2.5 mg/3 ml nebulizer, Take 3 mL by nebulization Every 4 (Four) Hours As Needed for Wheezing. (Patient not taking: Reported on 11/14/2024), Disp: , Rfl:     ketotifen (ZADITOR) 0.025 % ophthalmic solution, Administer 1 drop to both eyes 2 (Two) Times a Day. (Patient not taking: Reported on 11/14/2024), Disp: , Rfl:     metoprolol succinate XL (TOPROL-XL) 25 MG 24 hr tablet, Take 1 tablet by mouth Every 12 (Twelve) Hours. (Patient not taking: Reported on 11/14/2024), Disp: , Rfl:     midodrine (PROAMATINE) 10 MG tablet, Take 1 tablet by mouth 3 (Three) Times a Day Before Meals. (Patient not taking: Reported on 11/14/2024), Disp: , Rfl:     sodium-potassium-magnesium sulfates (SUPREP) 17.5-3.13-1.6 GM/177ML solution oral solution, , Disp: , Rfl:     Tafamidis (VYNDAMAX) 61 MG capsule, Take 1 capsule by mouth. (Patient not taking: Reported on 11/14/2024), Disp: , Rfl:     Vutrisiran Sodium (Amvuttra) 25 MG/0.5ML solution prefilled syringe syringe, 0.5 mL. (Patient not taking: Reported on 11/14/2024), Disp: , Rfl:      Objective   Physical Exam  Constitutional:       General: He is not in acute distress.     Appearance: Normal appearance. He is normal weight.   HENT:      Right Ear: Hearing normal.      Left Ear: Hearing normal.      Nose: No nasal tenderness or congestion.      Mouth/Throat:      Mouth: Mucous membranes are moist. No oral lesions.   Eyes:      Extraocular Movements: Extraocular movements intact.      Pupils: Pupils are equal, round, and reactive to light.   Cardiovascular:      Rate and Rhythm: Normal rate and regular rhythm.      Pulses: Normal pulses.      Heart sounds:  "Normal heart sounds. No murmur heard.  Pulmonary:      Effort: Pulmonary effort is normal.      Breath sounds: Normal breath sounds. No wheezing, rhonchi or rales.   Musculoskeletal:      Right lower leg: No edema.      Left lower leg: No edema.   Skin:     General: Skin is warm and dry.      Findings: No lesion or rash.   Neurological:      General: No focal deficit present.      Mental Status: He is alert and oriented to person, place, and time.   Psychiatric:         Mood and Affect: Affect normal. Mood is not anxious or depressed.         Vital Signs:   /86 (BP Location: Left arm, Patient Position: Sitting, Cuff Size: Large Adult)   Pulse 72   Temp 97.6 °F (36.4 °C) (Oral)   Resp 16   Ht 172.7 cm (68\")   Wt 93 kg (205 lb)   SpO2 96% Comment: RA; 2 L's PRN  BMI 31.17 kg/m²        Result Review :   The following data was reviewed by: TONY Tillman on 11/14/2024:    Data reviewed : Radiologic studies chest CT 3/6/2023, chest xray 8/5/2023, chest CT 11/1/2023 and my last office note    Procedures        Assessment and Plan    Diagnoses and all orders for this visit:    1. Severe persistent asthma without complication (Primary)  Comments:  Continue Brovana, Salvatore and Yupelri  Orders:  -     albuterol sulfate  (90 Base) MCG/ACT inhaler; Inhale 2 puffs Every 4 (Four) Hours As Needed for Wheezing.  Dispense: 54 g; Refill: 3    2. Chronic obstructive pulmonary disease, unspecified COPD type  Comments:  Continue Brovana, Pulmicpat and Yupelri  Orders:  -     albuterol sulfate  (90 Base) MCG/ACT inhaler; Inhale 2 puffs Every 4 (Four) Hours As Needed for Wheezing.  Dispense: 54 g; Refill: 3    3. Dyspnea on exertion  Comments:  Continue albuterol    4. Chronic cough  Comments:  Stable    5. Chronic respiratory failure with hypoxia  Comments:  Continue oxygen    6. VICTOR MANUEL (obstructive sleep apnea)  Comments:  [facility]AP [facility]          Follow Up   Return in about 4 months " (around 3/14/2025) for Recheck with Feroz.  Patient was given instructions and counseling regarding his condition or for health maintenance advice. Please see specific information pulled into the AVS if appropriate.

## 2025-03-28 ENCOUNTER — TELEPHONE (OUTPATIENT)
Dept: PULMONOLOGY | Facility: CLINIC | Age: 80
End: 2025-03-28

## 2025-03-28 ENCOUNTER — OFFICE VISIT (OUTPATIENT)
Dept: PULMONOLOGY | Facility: CLINIC | Age: 80
End: 2025-03-28
Payer: OTHER GOVERNMENT

## 2025-03-28 VITALS
DIASTOLIC BLOOD PRESSURE: 66 MMHG | WEIGHT: 201.8 LBS | HEART RATE: 83 BPM | RESPIRATION RATE: 18 BRPM | HEIGHT: 68 IN | SYSTOLIC BLOOD PRESSURE: 109 MMHG | TEMPERATURE: 97.9 F | BODY MASS INDEX: 30.58 KG/M2 | OXYGEN SATURATION: 99 %

## 2025-03-28 DIAGNOSIS — J45.50 SEVERE PERSISTENT ASTHMA WITHOUT COMPLICATION: Primary | ICD-10-CM

## 2025-03-28 DIAGNOSIS — R06.09 DYSPNEA ON EXERTION: ICD-10-CM

## 2025-03-28 DIAGNOSIS — J45.50 SEVERE PERSISTENT ASTHMA WITHOUT COMPLICATION: Primary | Chronic | ICD-10-CM

## 2025-03-28 DIAGNOSIS — J96.11 CHRONIC HYPOXIC RESPIRATORY FAILURE: ICD-10-CM

## 2025-03-28 DIAGNOSIS — J44.9 CHRONIC OBSTRUCTIVE PULMONARY DISEASE, UNSPECIFIED COPD TYPE: Chronic | ICD-10-CM

## 2025-03-28 DIAGNOSIS — J44.9 CHRONIC OBSTRUCTIVE PULMONARY DISEASE, UNSPECIFIED COPD TYPE: ICD-10-CM

## 2025-03-28 RX ORDER — ARFORMOTEROL TARTRATE 15 UG/2ML
15 SOLUTION RESPIRATORY (INHALATION)
Qty: 270 ML | Refills: 3 | Status: SHIPPED | OUTPATIENT
Start: 2025-03-28

## 2025-03-28 RX ORDER — BUDESONIDE 0.5 MG/2ML
0.5 INHALANT ORAL
Qty: 180 EACH | Refills: 3 | Status: SHIPPED | OUTPATIENT
Start: 2025-03-28

## 2025-03-28 RX ORDER — TIOTROPIUM BROMIDE INHALATION SPRAY 3.12 UG/1
2 SPRAY, METERED RESPIRATORY (INHALATION)
Qty: 3 EACH | Refills: 3 | Status: SHIPPED | OUTPATIENT
Start: 2025-03-28

## 2025-03-28 RX ORDER — TRANEXAMIC ACID 650 MG/1
TABLET ORAL
COMMUNITY
Start: 2025-03-10

## 2025-03-28 RX ORDER — FLUTICASONE PROPIONATE AND SALMETEROL 250; 50 UG/1; UG/1
1 POWDER RESPIRATORY (INHALATION)
Qty: 180 EACH | Refills: 3 | Status: SHIPPED | OUTPATIENT
Start: 2025-03-28

## 2025-03-28 RX ORDER — ALBUTEROL SULFATE 90 UG/1
2 INHALANT RESPIRATORY (INHALATION) EVERY 4 HOURS PRN
Qty: 54 G | Refills: 3 | Status: SHIPPED | OUTPATIENT
Start: 2025-03-28

## 2025-03-28 RX ORDER — INSULIN GLARGINE-YFGN 100 [IU]/ML
INJECTION, SOLUTION SUBCUTANEOUS
COMMUNITY
Start: 2025-01-17

## 2025-03-28 RX ORDER — REVEFENACIN 175 UG/3ML
175 SOLUTION RESPIRATORY (INHALATION)
Qty: 270 ML | Refills: 3 | Status: SHIPPED | OUTPATIENT
Start: 2025-03-28

## 2025-03-28 NOTE — PROGRESS NOTES
Primary Care Provider  Torres Acevedo MD     Referring Provider  Choco Carrasco MD       Patient or patient representative verbalized consent for the use of Ambient Listening during the visit with  TONY Tillman for chart documentation. 3/28/2025  10:45 EDT    Chief Complaint  Asthma, Shortness of Breath (With ambulation, when laying down ), Follow-up (4 Month ), and Dizziness (When going from laying to sitting up )    Subjective          Andrew Hart presents to [facility]ULMON[facility]ARE [facility]INE  History of Present Illness  Andrew Hart is a 79 y.o. male patient of Dr. Silvestre here for management of severe persistent asthma, COPD, shortness of breath and chronic cough.     History of Present Illness  The patient is a 79-year-old male who presents for evaluation of respiratory issues and low iron levels.    He has been experiencing an increased need for albuterol, which he uses frequently. He was short of breath upon arrival to the clinic today. He has discontinued the use of his nebulizer treatments, including Brovana and Pulmicort, due to uncertainty about the prescribing physician. Despite consultations with three different physicians and a [facility]. He reports no fevers, chills, or productive cough. He occasionally experiences temperature fluctuations, feeling cold at times and hot at others. He recalls an episode of syncope accompanied by a sudden feeling of heat while seated, which was followed by significant blood loss. He was subsequently transported to [facility] in [facility], where he underwent blood work before being transferred to [facility].    He reports that his iron levels are low. He had an iron transfusion this month and was supposed to get another one, but his bladder was alright, so they did not give him. He is on his way down to the clinic to sign some papers so they can give  him an iron transfusion.    MEDICATIONS  Current: Albuterol.         His history of smoking is   Tobacco Use: Low Risk  (3/28/2025)    Patient History     Smoking Tobacco Use: Never     Smokeless Tobacco Use: Never     Passive Exposure: Never   .    Review of Systems   Constitutional:  Negative for chills, fatigue, fever, unexpected weight gain and unexpected weight loss.   HENT:  Congestion: Nasal.    Respiratory:  Positive for shortness of breath. Negative for apnea, cough and wheezing.         Negative for Hemoptysis     Cardiovascular:  Negative for chest pain, palpitations and leg swelling.   Skin:         Negative for cyanosis   Neurological:  Positive for dizziness.      Sleep: Negative for Excessive daytime sleepiness  Negative for morning headaches  Negative for Snoring    Family History   Family history unknown: Yes        Social History     Socioeconomic History    Marital status:    Tobacco Use    Smoking status: Never     Passive exposure: Never    Smokeless tobacco: Never   Vaping Use    Vaping status: Never Used   Substance and Sexual Activity    Alcohol use: Not Currently    Drug use: Never    Sexual activity: Defer        Past Medical History:   Diagnosis Date    Asthma, intrinsic     CHF (congestive heart failure)     COPD (chronic obstructive pulmonary disease)     PTSD (post-traumatic stress disorder)     Renal stone         Immunization History   Administered Date(s) Administered    ABRYSVO (RSV, 60+ or pregnant women 32-36 wks) 04/01/2024    COVID-19 (MODERNA) 12YRS+ (SPIKEVAX) 10/18/2023, 12/05/2024    COVID-19 (MODERNA) 1st,2nd,3rd Dose Monovalent 02/22/2021, 03/29/2021, 10/25/2021, 11/14/2022    COVID-19 (MODERNA) BIVALENT 12+YRS 11/14/2022    Fluzone High-Dose 65+YRS 10/26/2016, 12/31/2022, 09/12/2023, 12/05/2024    Fluzone High-Dose 65+yrs 09/12/2023    PEDS-Pneumococcal Conjugate (PCV7) 09/12/2023    Pneumococcal Conjugate 13-Valent (PCV13) 03/08/2016, 10/26/2016    Pneumococcal  Conjugate 20-Valent (PCV20) 09/12/2023    Pneumococcal Polysaccharide (PPSV23) 09/19/2018    Pneumococcal, Unspecified 07/05/2012    Shingrix 11/08/2023         No Known Allergies       Current Outpatient Medications:     Accu-Chek Softclix Lancets lancets, by Other route. Use as instructed, Disp: , Rfl:     albuterol (PROVENTIL) (2.5 MG/3ML) 0.083% nebulizer solution, Take 2.5 mg by nebulization Every 4 (Four) Hours As Needed for Wheezing., Disp: 360 each, Rfl: 3    albuterol sulfate  (90 Base) MCG/ACT inhaler, Inhale 2 puffs Every 4 (Four) Hours As Needed for Wheezing., Disp: 54 g, Rfl: 3    amiodarone (PACERONE) 200 MG tablet, , Disp: , Rfl:     amiodarone in dextrose 5% (NEXTERONE) infusion, Infuse 0.5 mg/min into a venous catheter Continuous., Disp: , Rfl:     apixaban (ELIQUIS) 5 MG tablet tablet, Take 1 tablet by mouth Every 12 (Twelve) Hours. Indications: Atrial Fibrillation, Disp: 60 tablet, Rfl:     atorvastatin (LIPITOR) 40 MG tablet, Take 1 tablet by mouth Daily., Disp: , Rfl:     bumetanide (BUMEX) 1 MG tablet, Take 1 tablet by mouth Daily., Disp: , Rfl:     bumetanide (BUMEX) 2 MG tablet, Take 1 tablet by mouth., Disp: , Rfl:     cetirizine (zyrTEC) 10 MG tablet, Take 1 tablet by mouth Daily., Disp: , Rfl:     donepezil (ARICEPT) 5 MG tablet, Take 1 tablet by mouth Every Night., Disp: , Rfl:     empagliflozin (JARDIANCE) 25 MG tablet tablet, Take 0.5 tablets by mouth., Disp: , Rfl:     FLUoxetine (PROzac) 20 MG capsule, Take 1 capsule by mouth Every Morning., Disp: , Rfl:     furosemide (LASIX) 10 MG/ML injection, Infuse 4 mL into a venous catheter Every 8 (Eight) Hours., Disp: , Rfl:     Insulin Glargine-yfgn 100 UNIT/ML solution pen-injector, , Disp: , Rfl:     ketotifen (ZADITOR) 0.025 % ophthalmic solution, Administer 1 drop to both eyes 2 (Two) Times a Day., Disp: , Rfl:     metoprolol succinate XL (TOPROL-XL) 25 MG 24 hr tablet, Take 1 tablet by mouth Every 12 (Twelve) Hours., Disp: , Rfl:      midodrine (PROAMATINE) 10 MG tablet, Take 1 tablet by mouth 3 (Three) Times a Day Before Meals., Disp: , Rfl:     ondansetron (ZOFRAN) 4 MG tablet, , Disp: , Rfl:     pantoprazole (PROTONIX) 40 MG EC tablet, , Disp: , Rfl:     potassium chloride (K-DUR,KLOR-CON) 10 MEQ CR tablet, Take 1 tablet by mouth Daily., Disp: , Rfl:     sodium-potassium-magnesium sulfates (SUPREP) 17.5-3.13-1.6 GM/177ML solution oral solution, , Disp: , Rfl:     Tafamidis (VYNDAMAX) 61 MG capsule, Take 1 capsule by mouth., Disp: , Rfl:     Tranexamic Acid 650 MG tablet, , Disp: , Rfl:     traZODone (DESYREL) 50 MG tablet, Take 0.5 tablets by mouth Every Night., Disp: , Rfl:     Vitamin A 3 MG (77556 UT) capsule, Take 1 tablet by mouth Daily., Disp: , Rfl:     Vutrisiran Sodium (Amvuttra) 25 MG/0.5ML solution prefilled syringe syringe, 0.5 mL., Disp: , Rfl:     [place] 45 MG/0.8ML solution auto-injector, Inject 0.8 mL under the skin into the appropriate area as directed., Disp: , Rfl:     arformoterol (Brovana) 15 MCG/2ML nebulizer solution, Take 2 mL by nebulization 2 (Two) Times a Day. (Patient not taking: Reported on 3/28/2025), Disp: 360 mL, Rfl: 3    arformoterol (Brovana) 15 MCG/2ML nebulizer solution, Take 2 mL by nebulization 2 (Two) Times a Day., Disp: 270 mL, Rfl: 3    budesonide (Pulmicort) 0.5 MG/2ML nebulizer solution, Take 2 mL by nebulization 2 (Two) Times a Day. (Patient not taking: Reported on 3/28/2025), Disp: 360 mL, Rfl: 3    budesonide (Pulmicort) 0.5 MG/2ML nebulizer solution, Take 2 mL by nebulization 2 (Two) Times a Day., Disp: 180 each, Rfl: 3    insulin glargine (LANTUS, SEMGLEE) 100 UNIT/ML injection, Inject 35 Units under the skin into the appropriate area as directed Every Night. (Patient not taking: Reported on 12/12/2023), Disp: , Rfl:     ipratropium-albuterol (DUO-NEB) 0.5-2.5 mg/3 ml nebulizer, Take 3 mL by nebulization Every 4 (Four) Hours As Needed for Wheezing. (Patient not taking: Reported on  "12/12/2023), Disp: , Rfl:     revefenacin (Yupelri) 175 MCG/3ML nebulizer solution, Take 3 mL by nebulization Daily. (Patient not taking: Reported on 3/28/2025), Disp: 270 mL, Rfl: 3    revefenacin (Yupelri) 175 MCG/3ML nebulizer solution, Take 3 mL by nebulization Daily., Disp: 270 mL, Rfl: 3     Objective   Physical Exam  Constitutional:       General: He is not in acute distress.     Appearance: Normal appearance. He is normal weight.   HENT:      Right Ear: Hearing normal.      Left Ear: Hearing normal.      Nose: No nasal tenderness or congestion.      Mouth/Throat:      Mouth: Mucous membranes are moist. No oral lesions.   Eyes:      Extraocular Movements: Extraocular movements intact.      Pupils: Pupils are equal, round, and reactive to light.   Cardiovascular:      Rate and Rhythm: Normal rate and regular rhythm.      Pulses: Normal pulses.      Heart sounds: Normal heart sounds. No murmur heard.  Pulmonary:      Effort: Pulmonary effort is normal.      Breath sounds: Normal breath sounds. No wheezing, rhonchi or rales.   Musculoskeletal:      Right lower leg: No edema.      Left lower leg: No edema.   Skin:     General: Skin is warm and dry.      Findings: No lesion or rash.   Neurological:      General: No focal deficit present.      Mental Status: He is alert and oriented to person, place, and time.   Psychiatric:         Mood and Affect: Affect normal. Mood is not anxious or depressed.         Vital Signs:   /66 (BP Location: Left arm, Patient Position: Sitting, Cuff Size: Adult)   Pulse 83   Temp 97.9 °F (36.6 °C) (Oral)   Resp 18   Ht 172.7 cm (68\")   Wt 91.5 kg (201 lb 12.8 oz)   SpO2 99% Comment: Room air  BMI 30.68 kg/m²        Result Review :   The following data was reviewed by: TONY Tillman on 03/28/2025:    Data reviewed : Radiologic studies chest CT 11/1/2023, PFT 3/6/2023  and my last office note    Procedures        Assessment and Plan    Diagnoses and all orders for " this visit:    1. Severe persistent asthma without complication (Primary)  Comments:  Restart Brovana, Pulmicort and Yupelri  Orders:  -     arformoterol (Brovana) 15 MCG/2ML nebulizer solution; Take 2 mL by nebulization 2 (Two) Times a Day.  Dispense: 270 mL; Refill: 3  -     budesonide (Pulmicort) 0.5 MG/2ML nebulizer solution; Take 2 mL by nebulization 2 (Two) Times a Day.  Dispense: 180 each; Refill: 3  -     revefenacin (Yupelri) 175 MCG/3ML nebulizer solution; Take 3 mL by nebulization Daily.  Dispense: 270 mL; Refill: 3  -     albuterol sulfate  (90 Base) MCG/ACT inhaler; Inhale 2 puffs Every 4 (Four) Hours As Needed for Wheezing.  Dispense: 54 g; Refill: 3  -     6 Minute Walk Test; Future  -     Oxygen Therapy    2. Chronic obstructive pulmonary disease, unspecified COPD type  Comments:  Restart Brovana, Pulmicort and Yupelri  Orders:  -     arformoterol (Brovana) 15 MCG/2ML nebulizer solution; Take 2 mL by nebulization 2 (Two) Times a Day.  Dispense: 270 mL; Refill: 3  -     budesonide (Pulmicort) 0.5 MG/2ML nebulizer solution; Take 2 mL by nebulization 2 (Two) Times a Day.  Dispense: 180 each; Refill: 3  -     revefenacin (Yupelri) 175 MCG/3ML nebulizer solution; Take 3 mL by nebulization Daily.  Dispense: 270 mL; Refill: 3  -     albuterol sulfate  (90 Base) MCG/ACT inhaler; Inhale 2 puffs Every 4 (Four) Hours As Needed for Wheezing.  Dispense: 54 g; Refill: 3  -     6 Minute Walk Test; Future  -     Oxygen Therapy    3. Chronic hypoxic respiratory failure  -     6 Minute Walk Test; Future  -     Oxygen Therapy    4. Dyspnea on exertion  Comments:  Continue albuterol as needed        Assessment & Plan  1.  Asthma/COPD  He has been using albuterol more frequently than usual and is currently not taking his Brovana and Pulmicort due to [facility]. [facility] prescription for Brovana, Pulmicort and Yupelri [facility] to ensure he resumes these medications.  He is advised to continue using his albuterol inhaler as needed. If the [facility] does not [facility] the nebulizer treatment, an inhaler with the same medications will be considered as an alternative.    2. Low iron levels.  He reported having low iron levels and receiving an iron transfusion earlier this month. He is on his way to the clinic to sign papers for another iron transfusion.          Follow Up   Return in about 3 months (around 6/28/2025) for Recheck.  Patient was given instructions and counseling regarding his condition or for health maintenance advice. Please see specific information pulled into the AVS if appropriate.

## 2025-03-28 NOTE — TELEPHONE ENCOUNTER
[facility] Wixela 250 and Spiriva to pharmacy due to being on formulary.

## 2025-08-13 ENCOUNTER — OFFICE VISIT (OUTPATIENT)
Dept: PULMONOLOGY | Facility: CLINIC | Age: 80
End: 2025-08-13
Payer: OTHER GOVERNMENT

## 2025-08-13 VITALS
OXYGEN SATURATION: 99 % | WEIGHT: 208 LBS | HEART RATE: 70 BPM | HEIGHT: 68 IN | SYSTOLIC BLOOD PRESSURE: 104 MMHG | RESPIRATION RATE: 18 BRPM | DIASTOLIC BLOOD PRESSURE: 67 MMHG | BODY MASS INDEX: 31.52 KG/M2

## 2025-08-13 DIAGNOSIS — J96.11 CHRONIC HYPOXIC RESPIRATORY FAILURE: Chronic | ICD-10-CM

## 2025-08-13 DIAGNOSIS — G47.33 OSA (OBSTRUCTIVE SLEEP APNEA): Chronic | ICD-10-CM

## 2025-08-13 DIAGNOSIS — J44.9 CHRONIC OBSTRUCTIVE PULMONARY DISEASE, UNSPECIFIED COPD TYPE: Chronic | ICD-10-CM

## 2025-08-13 DIAGNOSIS — R06.09 DYSPNEA ON EXERTION: ICD-10-CM

## 2025-08-13 DIAGNOSIS — J45.50 SEVERE PERSISTENT ASTHMA WITHOUT COMPLICATION: Primary | Chronic | ICD-10-CM

## 2025-08-13 RX ORDER — ACORAMIDIS HYDROCHLORIDE 356 MG/1
712 TABLET, FILM COATED ORAL
COMMUNITY
Start: 2025-04-10 | End: 2026-04-05

## 2025-08-13 RX ORDER — CARBOXYMETHYLCELLULOSE SODIUM 5 MG/ML
SOLUTION/ DROPS OPHTHALMIC 3 TIMES DAILY PRN
COMMUNITY

## 2025-08-13 RX ORDER — OXCARBAZEPINE 150 MG/1
150 TABLET, FILM COATED ORAL 2 TIMES DAILY
COMMUNITY

## 2025-08-28 ENCOUNTER — DOCUMENTATION (OUTPATIENT)
Dept: CARDIOLOGY | Facility: HOSPITAL | Age: 80
End: 2025-08-28
Payer: OTHER GOVERNMENT

## 2025-08-28 ENCOUNTER — TELEPHONE (OUTPATIENT)
Dept: CARDIOLOGY | Facility: HOSPITAL | Age: 80
End: 2025-08-28
Payer: OTHER GOVERNMENT

## (undated) DEVICE — CATH F6 ST JR 4 100CM: Brand: SUPERTORQUE

## (undated) DEVICE — SUT SILK 0/0 CT2 18IN C027D

## (undated) DEVICE — CATH F6 ST JL 5 100CM: Brand: SUPERTORQUE

## (undated) DEVICE — ST ACC MICROPUNCTURE STFF .018 ECHO/PLDM/TP 4F/10CM 21G/7CM

## (undated) DEVICE — CATH F6 ST JL 4 100CM: Brand: SUPERTORQUE

## (undated) DEVICE — GW FC FLOP/TP .035 260CM 3MM

## (undated) DEVICE — KT CATH CAD SUP IMPELLA/CP 9/14F 5L

## (undated) DEVICE — SI AVANTI+ 7F STD W/GW  NO OBT: Brand: AVANTI

## (undated) DEVICE — BRACHIAL/AXILLARY/CEREBRAL DRAPE 38 1/2" X 60": Brand: BRACHIAL/AXILLARY/CEREBRAL DRAPE

## (undated) DEVICE — SWAN-GANZ TRUE SIZE THERMODILUTION "S" TIP: Brand: SWAN-GANZ TRUE SIZE

## (undated) DEVICE — DIL VESL 12F.038 20CM

## (undated) DEVICE — PERCLOSE PROGLIDE™ SUTURE-MEDIATED CLOSURE SYSTEM: Brand: PERCLOSE PROGLIDE™

## (undated) DEVICE — CATH F5INF TLPIGST145 110CM6SH: Brand: INFINITI

## (undated) DEVICE — PINNACLE PRECISION ACCESS SYSTEM INTRODUCER SHEATH: Brand: PINNACLE PRECISION ACCESS SYSTEM